# Patient Record
Sex: MALE | Race: OTHER | NOT HISPANIC OR LATINO
[De-identification: names, ages, dates, MRNs, and addresses within clinical notes are randomized per-mention and may not be internally consistent; named-entity substitution may affect disease eponyms.]

---

## 2019-01-18 ENCOUNTER — TRANSCRIPTION ENCOUNTER (OUTPATIENT)
Age: 29
End: 2019-01-18

## 2021-11-03 ENCOUNTER — INPATIENT (INPATIENT)
Facility: HOSPITAL | Age: 31
LOS: 2 days | Discharge: HOME | End: 2021-11-06
Attending: INTERNAL MEDICINE | Admitting: INTERNAL MEDICINE
Payer: COMMERCIAL

## 2021-11-03 VITALS
TEMPERATURE: 101 F | HEART RATE: 73 BPM | WEIGHT: 186.95 LBS | SYSTOLIC BLOOD PRESSURE: 115 MMHG | OXYGEN SATURATION: 100 % | DIASTOLIC BLOOD PRESSURE: 74 MMHG | RESPIRATION RATE: 18 BRPM

## 2021-11-03 DIAGNOSIS — I21.3 ST ELEVATION (STEMI) MYOCARDIAL INFARCTION OF UNSPECIFIED SITE: ICD-10-CM

## 2021-11-03 LAB
A1C WITH ESTIMATED AVERAGE GLUCOSE RESULT: 5.2 % — SIGNIFICANT CHANGE UP (ref 4–5.6)
ALBUMIN SERPL ELPH-MCNC: 4.1 G/DL — SIGNIFICANT CHANGE UP (ref 3.3–5)
ALBUMIN SERPL ELPH-MCNC: 4.6 G/DL — SIGNIFICANT CHANGE UP (ref 3.3–5)
ALP SERPL-CCNC: 76 U/L — SIGNIFICANT CHANGE UP (ref 40–120)
ALP SERPL-CCNC: 82 U/L — SIGNIFICANT CHANGE UP (ref 40–120)
ALT FLD-CCNC: 16 U/L — SIGNIFICANT CHANGE UP (ref 4–41)
ALT FLD-CCNC: 24 U/L — SIGNIFICANT CHANGE UP (ref 4–41)
AMPHET UR-MCNC: NEGATIVE — SIGNIFICANT CHANGE UP
ANION GAP SERPL CALC-SCNC: 11 MMOL/L — SIGNIFICANT CHANGE UP (ref 7–14)
ANION GAP SERPL CALC-SCNC: 11 MMOL/L — SIGNIFICANT CHANGE UP (ref 7–14)
APAP SERPL-MCNC: 7 UG/ML — LOW (ref 15–25)
APPEARANCE UR: CLEAR — SIGNIFICANT CHANGE UP
APTT BLD: 32 SEC — SIGNIFICANT CHANGE UP (ref 27–36.3)
AST SERPL-CCNC: 122 U/L — HIGH (ref 4–40)
AST SERPL-CCNC: 32 U/L — SIGNIFICANT CHANGE UP (ref 4–40)
B PERT DNA SPEC QL NAA+PROBE: SIGNIFICANT CHANGE UP
B PERT+PARAPERT DNA PNL SPEC NAA+PROBE: SIGNIFICANT CHANGE UP
BARBITURATES UR SCN-MCNC: NEGATIVE — SIGNIFICANT CHANGE UP
BASOPHILS # BLD AUTO: 0.05 K/UL — SIGNIFICANT CHANGE UP (ref 0–0.2)
BASOPHILS # BLD AUTO: 0.06 K/UL — SIGNIFICANT CHANGE UP (ref 0–0.2)
BASOPHILS NFR BLD AUTO: 0.4 % — SIGNIFICANT CHANGE UP (ref 0–2)
BASOPHILS NFR BLD AUTO: 0.4 % — SIGNIFICANT CHANGE UP (ref 0–2)
BENZODIAZ UR-MCNC: NEGATIVE — SIGNIFICANT CHANGE UP
BILIRUB SERPL-MCNC: 0.4 MG/DL — SIGNIFICANT CHANGE UP (ref 0.2–1.2)
BILIRUB SERPL-MCNC: 0.4 MG/DL — SIGNIFICANT CHANGE UP (ref 0.2–1.2)
BILIRUB UR-MCNC: NEGATIVE — SIGNIFICANT CHANGE UP
BLD GP AB SCN SERPL QL: NEGATIVE — SIGNIFICANT CHANGE UP
BORDETELLA PARAPERTUSSIS (RAPRVP): SIGNIFICANT CHANGE UP
BUN SERPL-MCNC: 11 MG/DL — SIGNIFICANT CHANGE UP (ref 7–23)
BUN SERPL-MCNC: 13 MG/DL — SIGNIFICANT CHANGE UP (ref 7–23)
C PNEUM DNA SPEC QL NAA+PROBE: SIGNIFICANT CHANGE UP
CALCIUM SERPL-MCNC: 8.8 MG/DL — SIGNIFICANT CHANGE UP (ref 8.4–10.5)
CALCIUM SERPL-MCNC: 9.3 MG/DL — SIGNIFICANT CHANGE UP (ref 8.4–10.5)
CHLORIDE SERPL-SCNC: 100 MMOL/L — SIGNIFICANT CHANGE UP (ref 98–107)
CHLORIDE SERPL-SCNC: 96 MMOL/L — LOW (ref 98–107)
CHOLEST SERPL-MCNC: 142 MG/DL — SIGNIFICANT CHANGE UP
CK MB BLD-MCNC: 10.8 % — HIGH (ref 0–2.5)
CK MB BLD-MCNC: 10.8 % — HIGH (ref 0–2.5)
CK MB BLD-MCNC: 9.4 % — HIGH (ref 0–2.5)
CK MB CFR SERPL CALC: 137.2 NG/ML — HIGH
CK MB CFR SERPL CALC: 140.6 NG/ML — HIGH
CK MB CFR SERPL CALC: 194.8 NG/ML — HIGH
CK MB CFR SERPL CALC: 99 NG/ML — HIGH
CK SERPL-CCNC: 1465 U/L — HIGH (ref 30–200)
CK SERPL-CCNC: 1808 U/L — HIGH (ref 30–200)
CK SERPL-CCNC: 919 U/L — HIGH (ref 30–200)
CO2 SERPL-SCNC: 24 MMOL/L — SIGNIFICANT CHANGE UP (ref 22–31)
CO2 SERPL-SCNC: 25 MMOL/L — SIGNIFICANT CHANGE UP (ref 22–31)
COCAINE METAB.OTHER UR-MCNC: NEGATIVE — SIGNIFICANT CHANGE UP
COLOR SPEC: SIGNIFICANT CHANGE UP
CREAT SERPL-MCNC: 1.01 MG/DL — SIGNIFICANT CHANGE UP (ref 0.5–1.3)
CREAT SERPL-MCNC: 1.05 MG/DL — SIGNIFICANT CHANGE UP (ref 0.5–1.3)
CREATININE URINE RESULT, DAU: 127 MG/DL — SIGNIFICANT CHANGE UP
CRP SERPL-MCNC: 85.7 MG/L — HIGH
DIFF PNL FLD: NEGATIVE — SIGNIFICANT CHANGE UP
EOSINOPHIL # BLD AUTO: 0.03 K/UL — SIGNIFICANT CHANGE UP (ref 0–0.5)
EOSINOPHIL # BLD AUTO: 0.08 K/UL — SIGNIFICANT CHANGE UP (ref 0–0.5)
EOSINOPHIL NFR BLD AUTO: 0.2 % — SIGNIFICANT CHANGE UP (ref 0–6)
EOSINOPHIL NFR BLD AUTO: 0.6 % — SIGNIFICANT CHANGE UP (ref 0–6)
ERYTHROCYTE [SEDIMENTATION RATE] IN BLOOD: 14 MM/HR — SIGNIFICANT CHANGE UP (ref 1–15)
ERYTHROCYTE [SEDIMENTATION RATE] IN BLOOD: 22 MM/HR — HIGH (ref 1–15)
ESTIMATED AVERAGE GLUCOSE: 103 — SIGNIFICANT CHANGE UP
ETHANOL SERPL-MCNC: <10 MG/DL — SIGNIFICANT CHANGE UP
FLUAV SUBTYP SPEC NAA+PROBE: SIGNIFICANT CHANGE UP
FLUBV RNA SPEC QL NAA+PROBE: SIGNIFICANT CHANGE UP
GLUCOSE SERPL-MCNC: 133 MG/DL — HIGH (ref 70–99)
GLUCOSE SERPL-MCNC: 169 MG/DL — HIGH (ref 70–99)
GLUCOSE UR QL: NEGATIVE — SIGNIFICANT CHANGE UP
HADV DNA SPEC QL NAA+PROBE: SIGNIFICANT CHANGE UP
HCOV 229E RNA SPEC QL NAA+PROBE: SIGNIFICANT CHANGE UP
HCOV HKU1 RNA SPEC QL NAA+PROBE: SIGNIFICANT CHANGE UP
HCOV NL63 RNA SPEC QL NAA+PROBE: SIGNIFICANT CHANGE UP
HCOV OC43 RNA SPEC QL NAA+PROBE: SIGNIFICANT CHANGE UP
HCT VFR BLD CALC: 36.9 % — LOW (ref 39–50)
HCT VFR BLD CALC: 38.1 % — LOW (ref 39–50)
HCT VFR BLD CALC: 39.6 % — SIGNIFICANT CHANGE UP (ref 39–50)
HDLC SERPL-MCNC: 56 MG/DL — SIGNIFICANT CHANGE UP
HGB BLD-MCNC: 11.6 G/DL — LOW (ref 13–17)
HGB BLD-MCNC: 12.3 G/DL — LOW (ref 13–17)
HGB BLD-MCNC: 12.6 G/DL — LOW (ref 13–17)
HMPV RNA SPEC QL NAA+PROBE: SIGNIFICANT CHANGE UP
HPIV1 RNA SPEC QL NAA+PROBE: SIGNIFICANT CHANGE UP
HPIV2 RNA SPEC QL NAA+PROBE: SIGNIFICANT CHANGE UP
HPIV3 RNA SPEC QL NAA+PROBE: SIGNIFICANT CHANGE UP
HPIV4 RNA SPEC QL NAA+PROBE: SIGNIFICANT CHANGE UP
IANC: 12.53 K/UL — HIGH (ref 1.5–8.5)
IANC: 9.5 K/UL — HIGH (ref 1.5–8.5)
IMM GRANULOCYTES NFR BLD AUTO: 0.4 % — SIGNIFICANT CHANGE UP (ref 0–1.5)
IMM GRANULOCYTES NFR BLD AUTO: 0.4 % — SIGNIFICANT CHANGE UP (ref 0–1.5)
INR BLD: 1.18 RATIO — HIGH (ref 0.88–1.16)
KETONES UR-MCNC: NEGATIVE — SIGNIFICANT CHANGE UP
LACTATE SERPL-SCNC: 2 MMOL/L — SIGNIFICANT CHANGE UP (ref 0.5–2)
LEUKOCYTE ESTERASE UR-ACNC: NEGATIVE — SIGNIFICANT CHANGE UP
LIDOCAIN IGE QN: 52 U/L — SIGNIFICANT CHANGE UP (ref 7–60)
LIPID PNL WITH DIRECT LDL SERPL: 77 MG/DL — SIGNIFICANT CHANGE UP
LYMPHOCYTES # BLD AUTO: 1.56 K/UL — SIGNIFICANT CHANGE UP (ref 1–3.3)
LYMPHOCYTES # BLD AUTO: 1.82 K/UL — SIGNIFICANT CHANGE UP (ref 1–3.3)
LYMPHOCYTES # BLD AUTO: 13.5 % — SIGNIFICANT CHANGE UP (ref 13–44)
LYMPHOCYTES # BLD AUTO: 9.6 % — LOW (ref 13–44)
M PNEUMO DNA SPEC QL NAA+PROBE: SIGNIFICANT CHANGE UP
MCHC RBC-ENTMCNC: 22.6 PG — LOW (ref 27–34)
MCHC RBC-ENTMCNC: 22.6 PG — LOW (ref 27–34)
MCHC RBC-ENTMCNC: 22.7 PG — LOW (ref 27–34)
MCHC RBC-ENTMCNC: 31.4 GM/DL — LOW (ref 32–36)
MCHC RBC-ENTMCNC: 31.8 GM/DL — LOW (ref 32–36)
MCHC RBC-ENTMCNC: 32.3 GM/DL — SIGNIFICANT CHANGE UP (ref 32–36)
MCV RBC AUTO: 70 FL — LOW (ref 80–100)
MCV RBC AUTO: 71.5 FL — LOW (ref 80–100)
MCV RBC AUTO: 71.8 FL — LOW (ref 80–100)
METHADONE UR-MCNC: NEGATIVE — SIGNIFICANT CHANGE UP
MONOCYTES # BLD AUTO: 2 K/UL — HIGH (ref 0–0.9)
MONOCYTES # BLD AUTO: 2.03 K/UL — HIGH (ref 0–0.9)
MONOCYTES NFR BLD AUTO: 12.5 % — SIGNIFICANT CHANGE UP (ref 2–14)
MONOCYTES NFR BLD AUTO: 14.8 % — HIGH (ref 2–14)
NEUTROPHILS # BLD AUTO: 12.53 K/UL — HIGH (ref 1.8–7.4)
NEUTROPHILS # BLD AUTO: 9.5 K/UL — HIGH (ref 1.8–7.4)
NEUTROPHILS NFR BLD AUTO: 70.3 % — SIGNIFICANT CHANGE UP (ref 43–77)
NEUTROPHILS NFR BLD AUTO: 76.9 % — SIGNIFICANT CHANGE UP (ref 43–77)
NITRITE UR-MCNC: NEGATIVE — SIGNIFICANT CHANGE UP
NON HDL CHOLESTEROL: 86 MG/DL — SIGNIFICANT CHANGE UP
NRBC # BLD: 0 /100 WBCS — SIGNIFICANT CHANGE UP
NRBC # FLD: 0 K/UL — SIGNIFICANT CHANGE UP
NT-PROBNP SERPL-SCNC: 135 PG/ML — SIGNIFICANT CHANGE UP
OPIATES UR-MCNC: NEGATIVE — SIGNIFICANT CHANGE UP
OXYCODONE UR-MCNC: NEGATIVE — SIGNIFICANT CHANGE UP
PCP SPEC-MCNC: SIGNIFICANT CHANGE UP
PCP UR-MCNC: NEGATIVE — SIGNIFICANT CHANGE UP
PH UR: 7 — SIGNIFICANT CHANGE UP (ref 5–8)
PLATELET # BLD AUTO: 187 K/UL — SIGNIFICANT CHANGE UP (ref 150–400)
PLATELET # BLD AUTO: 221 K/UL — SIGNIFICANT CHANGE UP (ref 150–400)
PLATELET # BLD AUTO: 224 K/UL — SIGNIFICANT CHANGE UP (ref 150–400)
POTASSIUM SERPL-MCNC: 3.8 MMOL/L — SIGNIFICANT CHANGE UP (ref 3.5–5.3)
POTASSIUM SERPL-MCNC: 4 MMOL/L — SIGNIFICANT CHANGE UP (ref 3.5–5.3)
POTASSIUM SERPL-SCNC: 3.8 MMOL/L — SIGNIFICANT CHANGE UP (ref 3.5–5.3)
POTASSIUM SERPL-SCNC: 4 MMOL/L — SIGNIFICANT CHANGE UP (ref 3.5–5.3)
PROCALCITONIN SERPL-MCNC: 0.11 NG/ML — HIGH (ref 0.02–0.1)
PROCALCITONIN SERPL-MCNC: 0.12 NG/ML — HIGH (ref 0.02–0.1)
PROT SERPL-MCNC: 7.4 G/DL — SIGNIFICANT CHANGE UP (ref 6–8.3)
PROT SERPL-MCNC: 8.1 G/DL — SIGNIFICANT CHANGE UP (ref 6–8.3)
PROT UR-MCNC: NEGATIVE — SIGNIFICANT CHANGE UP
PROTHROM AB SERPL-ACNC: 13.5 SEC — SIGNIFICANT CHANGE UP (ref 10.6–13.6)
RAPID RVP RESULT: SIGNIFICANT CHANGE UP
RBC # BLD: 5.14 M/UL — SIGNIFICANT CHANGE UP (ref 4.2–5.8)
RBC # BLD: 5.44 M/UL — SIGNIFICANT CHANGE UP (ref 4.2–5.8)
RBC # BLD: 5.54 M/UL — SIGNIFICANT CHANGE UP (ref 4.2–5.8)
RBC # FLD: 15.2 % — HIGH (ref 10.3–14.5)
RBC # FLD: 15.5 % — HIGH (ref 10.3–14.5)
RBC # FLD: 15.8 % — HIGH (ref 10.3–14.5)
RH IG SCN BLD-IMP: POSITIVE — SIGNIFICANT CHANGE UP
RSV RNA SPEC QL NAA+PROBE: SIGNIFICANT CHANGE UP
RV+EV RNA SPEC QL NAA+PROBE: SIGNIFICANT CHANGE UP
SALICYLATES SERPL-MCNC: <0.3 MG/DL — LOW (ref 15–30)
SARS-COV-2 RNA SPEC QL NAA+PROBE: SIGNIFICANT CHANGE UP
SARS-COV-2 RNA SPEC QL NAA+PROBE: SIGNIFICANT CHANGE UP
SODIUM SERPL-SCNC: 132 MMOL/L — LOW (ref 135–145)
SODIUM SERPL-SCNC: 135 MMOL/L — SIGNIFICANT CHANGE UP (ref 135–145)
SP GR SPEC: 1.02 — SIGNIFICANT CHANGE UP (ref 1–1.05)
THC UR QL: NEGATIVE — SIGNIFICANT CHANGE UP
TOXICOLOGY SCREEN, DRUGS OF ABUSE, SERUM RESULT: SIGNIFICANT CHANGE UP
TRIGL SERPL-MCNC: 44 MG/DL — SIGNIFICANT CHANGE UP
TROPONIN T, HIGH SENSITIVITY RESULT: 1152 NG/L — CRITICAL HIGH
TROPONIN T, HIGH SENSITIVITY RESULT: 1768 NG/L — CRITICAL HIGH
TROPONIN T, HIGH SENSITIVITY RESULT: 2011 NG/L — CRITICAL HIGH
TROPONIN T, HIGH SENSITIVITY RESULT: 2281 NG/L — CRITICAL HIGH
TROPONIN T, HIGH SENSITIVITY RESULT: 653 NG/L — CRITICAL HIGH
TSH SERPL-MCNC: 2.04 UIU/ML — SIGNIFICANT CHANGE UP (ref 0.27–4.2)
UROBILINOGEN FLD QL: SIGNIFICANT CHANGE UP
WBC # BLD: 13.5 K/UL — HIGH (ref 3.8–10.5)
WBC # BLD: 14.24 K/UL — HIGH (ref 3.8–10.5)
WBC # BLD: 16.27 K/UL — HIGH (ref 3.8–10.5)
WBC # FLD AUTO: 13.5 K/UL — HIGH (ref 3.8–10.5)
WBC # FLD AUTO: 14.24 K/UL — HIGH (ref 3.8–10.5)
WBC # FLD AUTO: 16.27 K/UL — HIGH (ref 3.8–10.5)

## 2021-11-03 PROCEDURE — 71045 X-RAY EXAM CHEST 1 VIEW: CPT | Mod: 26

## 2021-11-03 PROCEDURE — 93306 TTE W/DOPPLER COMPLETE: CPT | Mod: 26

## 2021-11-03 PROCEDURE — 93458 L HRT ARTERY/VENTRICLE ANGIO: CPT | Mod: 26

## 2021-11-03 PROCEDURE — 99223 1ST HOSP IP/OBS HIGH 75: CPT

## 2021-11-03 PROCEDURE — 99291 CRITICAL CARE FIRST HOUR: CPT | Mod: 25

## 2021-11-03 PROCEDURE — 93010 ELECTROCARDIOGRAM REPORT: CPT

## 2021-11-03 PROCEDURE — 93306 TTE W/DOPPLER COMPLETE: CPT | Mod: 26,59,77

## 2021-11-03 RX ORDER — IBUPROFEN 200 MG
600 TABLET ORAL THREE TIMES A DAY
Refills: 0 | Status: DISCONTINUED | OUTPATIENT
Start: 2021-11-03 | End: 2021-11-03

## 2021-11-03 RX ORDER — TICAGRELOR 90 MG/1
180 TABLET ORAL ONCE
Refills: 0 | Status: COMPLETED | OUTPATIENT
Start: 2021-11-03 | End: 2021-11-03

## 2021-11-03 RX ORDER — ACETAMINOPHEN 500 MG
1000 TABLET ORAL ONCE
Refills: 0 | Status: DISCONTINUED | OUTPATIENT
Start: 2021-11-03 | End: 2021-11-03

## 2021-11-03 RX ORDER — ACETAMINOPHEN 500 MG
650 TABLET ORAL EVERY 6 HOURS
Refills: 0 | Status: DISCONTINUED | OUTPATIENT
Start: 2021-11-03 | End: 2021-11-03

## 2021-11-03 RX ORDER — HEPARIN SODIUM 5000 [USP'U]/ML
4000 INJECTION INTRAVENOUS; SUBCUTANEOUS ONCE
Refills: 0 | Status: COMPLETED | OUTPATIENT
Start: 2021-11-03 | End: 2021-11-03

## 2021-11-03 RX ORDER — TRAMADOL HYDROCHLORIDE 50 MG/1
50 TABLET ORAL ONCE
Refills: 0 | Status: DISCONTINUED | OUTPATIENT
Start: 2021-11-03 | End: 2021-11-03

## 2021-11-03 RX ORDER — ASPIRIN/CALCIUM CARB/MAGNESIUM 324 MG
324 TABLET ORAL ONCE
Refills: 0 | Status: COMPLETED | OUTPATIENT
Start: 2021-11-03 | End: 2021-11-03

## 2021-11-03 RX ORDER — INFLUENZA VIRUS VACCINE 15; 15; 15; 15 UG/.5ML; UG/.5ML; UG/.5ML; UG/.5ML
0.5 SUSPENSION INTRAMUSCULAR ONCE
Refills: 0 | Status: DISCONTINUED | OUTPATIENT
Start: 2021-11-03 | End: 2021-11-06

## 2021-11-03 RX ORDER — ATORVASTATIN CALCIUM 80 MG/1
80 TABLET, FILM COATED ORAL ONCE
Refills: 0 | Status: COMPLETED | OUTPATIENT
Start: 2021-11-03 | End: 2021-11-03

## 2021-11-03 RX ORDER — CHLORHEXIDINE GLUCONATE 213 G/1000ML
1 SOLUTION TOPICAL
Refills: 0 | Status: DISCONTINUED | OUTPATIENT
Start: 2021-11-03 | End: 2021-11-06

## 2021-11-03 RX ORDER — ACETAMINOPHEN 500 MG
650 TABLET ORAL EVERY 6 HOURS
Refills: 0 | Status: DISCONTINUED | OUTPATIENT
Start: 2021-11-03 | End: 2021-11-06

## 2021-11-03 RX ORDER — ACETAMINOPHEN 500 MG
650 TABLET ORAL ONCE
Refills: 0 | Status: COMPLETED | OUTPATIENT
Start: 2021-11-03 | End: 2021-11-03

## 2021-11-03 RX ORDER — COLCHICINE 0.6 MG
0.6 TABLET ORAL
Refills: 0 | Status: DISCONTINUED | OUTPATIENT
Start: 2021-11-03 | End: 2021-11-06

## 2021-11-03 RX ORDER — IBUPROFEN 200 MG
800 TABLET ORAL THREE TIMES A DAY
Refills: 0 | Status: DISCONTINUED | OUTPATIENT
Start: 2021-11-03 | End: 2021-11-06

## 2021-11-03 RX ADMIN — Medication 600 MILLIGRAM(S): at 14:11

## 2021-11-03 RX ADMIN — CHLORHEXIDINE GLUCONATE 1 APPLICATION(S): 213 SOLUTION TOPICAL at 08:00

## 2021-11-03 RX ADMIN — Medication 324 MILLIGRAM(S): at 04:04

## 2021-11-03 RX ADMIN — Medication 650 MILLIGRAM(S): at 14:10

## 2021-11-03 RX ADMIN — Medication 650 MILLIGRAM(S): at 06:56

## 2021-11-03 RX ADMIN — TICAGRELOR 180 MILLIGRAM(S): 90 TABLET ORAL at 04:15

## 2021-11-03 RX ADMIN — Medication 400 MILLIGRAM(S): at 15:00

## 2021-11-03 RX ADMIN — Medication 800 MILLIGRAM(S): at 22:28

## 2021-11-03 RX ADMIN — Medication 1000 MILLIGRAM(S): at 15:40

## 2021-11-03 RX ADMIN — TRAMADOL HYDROCHLORIDE 50 MILLIGRAM(S): 50 TABLET ORAL at 19:25

## 2021-11-03 RX ADMIN — Medication 600 MILLIGRAM(S): at 15:20

## 2021-11-03 RX ADMIN — Medication 650 MILLIGRAM(S): at 15:20

## 2021-11-03 RX ADMIN — Medication 650 MILLIGRAM(S): at 06:26

## 2021-11-03 RX ADMIN — TRAMADOL HYDROCHLORIDE 50 MILLIGRAM(S): 50 TABLET ORAL at 19:30

## 2021-11-03 RX ADMIN — Medication 0.6 MILLIGRAM(S): at 11:16

## 2021-11-03 RX ADMIN — HEPARIN SODIUM 4000 UNIT(S): 5000 INJECTION INTRAVENOUS; SUBCUTANEOUS at 04:19

## 2021-11-03 RX ADMIN — Medication 0.6 MILLIGRAM(S): at 17:35

## 2021-11-03 RX ADMIN — Medication 800 MILLIGRAM(S): at 22:52

## 2021-11-03 RX ADMIN — ATORVASTATIN CALCIUM 80 MILLIGRAM(S): 80 TABLET, FILM COATED ORAL at 04:04

## 2021-11-03 NOTE — H&P ADULT - NSHPLABSRESULTS_GEN_ALL_CORE
LABS:                          12.6   16.27 )-----------( 221      ( 03 Nov 2021 04:25 )             39.6     11-03    132<L>  |  96<L>  |  13  ----------------------------<  169<H>  3.8   |  25  |  1.05    Ca    9.3      03 Nov 2021 04:25    TPro  8.1  /  Alb  4.6  /  TBili  0.4  /  DBili  x   /  AST  32  /  ALT  16  /  AlkPhos  82  11-03    LIVER FUNCTIONS - ( 03 Nov 2021 04:25 )  Alb: 4.6 g/dL / Pro: 8.1 g/dL / ALK PHOS: 82 U/L / ALT: 16 U/L / AST: 32 U/L / GGT: x           PT/INR - ( 03 Nov 2021 04:25 )   PT: 13.5 sec;   INR: 1.18 ratio         PTT - ( 03 Nov 2021 04:25 )  PTT:32.0 sec    HsT: 653

## 2021-11-03 NOTE — ED ADULT NURSE NOTE - OBJECTIVE STATEMENT
30 yo M AAOx4 received to [facility] with midsternal CP with sudden onset around 22:00, nonradiating, reports having generalized fatigue, malaise, HA, and body aches for the past few days, denies fevers, appears pale with slight diaphoresis to skin, #18 placed b/l to AC's,  placed on zoll pads, no tachycardia on CM, pending cardiology consult to r/o MI

## 2021-11-03 NOTE — ED PROVIDER NOTE - CLINICAL SUMMARY MEDICAL DECISION MAKING FREE TEXT BOX
32 yo M with no known Past Medical History that presents with chest pain found on triage EKG to have STEMI. STEMI activation activated immediately upon reception of EKG and pt placed in main ED trauma A. Labs and imaging ordered, meds given including ASA, Brillinta, Lipitor, and Heparin given, Cardiac cath lab activated. Pt placed on Zoll monitoring and cardiac monitoring.

## 2021-11-03 NOTE — ED PROVIDER NOTE - CRITICAL CARE ATTENDING CONTRIBUTION TO CARE
Upon my evaluation, this patient had a high probability of imminent or life-threatening deterioration due to STEMI on EKG and chest pain which required my direct attention, intervention, and personal management.  The patient has a  medical condition that impairs one or more vital organ systems.  Frequent personal assessment and adjustment of medical interventions was performed.      I have personally provided 31 minutes of critical care time exclusive of time spent on separately billable procedures. Time includes review of laboratory data, radiology results, discussion with consultants, patient and family; monitoring for potential decompensation, as well as time spent retrieving data and reviewing the chart and documenting the visit. Interventions were performed as documented above.

## 2021-11-03 NOTE — PROGRESS NOTE ADULT - ASSESSMENT
32 yo male with no PMH presenting to the ED with chest pain since 10pm on 11/2 admitted to the hospital for STEMI. S/p RHC with no blockages seen. Transferred to CCU for possible myocarditis.     Plan:    Neuro:  - Awake, alert, oriented, x 4  - No active issues    HEENT:  - No active issues    Respiratory:  - Unlabored respirations on RA, 99% on RA  - No active issues    CV:  #Myocarditis   - Patient presented to ER with c/o chest pain.   - EKG revealed ST elevation in I, II, III, aVF, v5, v6, with STD in aVR, v1 v2  - Initial set cardiac enzyme 654  - S/p RHC with  and Brilinta 180 mg and heparin load  - Cath showed clean coronaries  - Pending TTE   - Serial EKG to assess for resolution of ST changes  - Monitor vital signs to monitor hemodynamic stability  - Continuous cardiac monitoring to monitor for arrhythmias  - Trend cardiac enzymes  - Check radial for hematoma or bleeding    GI:  - DASH diet  - No active issues    Renal:  - SCr 1.05  - No active issues    Endocrine:  - HbA1c 5.2, lipid panel wnl, TSH 2.04 wnl   - No other active issues    ID:  - Febrile 100.9F in ED  - F/u cultures  - RVP, COVID PCR, Bordatella negative   - Vaccinated with Pfizer x2     DVT prophylaxis:  Heparin subq    Ethics: FULL CODE 30 yo male with no PMH presenting to the ED with chest pain and a few days of fever, chills, myalgias admitted to the hospital for GHADA in I, II, III, aVF, V4-V6 and STD in avR, V1, V2. S/p RHC with no blockages seen. Transferred to CCU for possible myocarditis.     Plan:    Neuro:  - Awake, alert, oriented, x 4  - No active issues    HEENT:  - No active issues    Respiratory:  - Unlabored respirations on RA, 99% on RA  - No active issues    CV:  #Myocarditis   - Patient presented to ER with c/o chest pain and troponin 653   - EKG revealed ST elevation in I, II, III, aVF, v5, v6, with STD in aVR, v1 v2  - S/p RHC with  and Brilinta 180 mg and heparin load  - Cath showed clean coronaries  - Pending TTE   - Serial EKG to assess for resolution of ST changes  - Continuous cardiac monitoring to monitor for arrhythmias  - Trend cardiac enzymes q6h until peak   - Tylenol PRN for pain     GI:  - DASH diet  - No active issues    Renal:  - SCr 1.05  - No active issues    Endocrine:  - HbA1c 5.2, lipid panel wnl, TSH 2.04 wnl   - No other active issues    ID:  - Febrile 100.9F in ED  - CXR: clear lungs   - F/u cultures  - RVP, COVID PCR, Bordetella negative   - Vaccinated with Pfizer x2 in 05/2021     DVT prophylaxis:  Heparin subq    Ethics: FULL CODE 32 yo male with no PMH presenting to the ED with chest pain and a few days of fever, chills, myalgias admitted to the hospital for GHADA in I, II, III, aVF, V4-V6 and STD in avR, V1, V2. S/p RHC with no blockages seen. Transferred to CCU for possible myocarditis.     Plan:    Neuro:  - Awake, alert, oriented, x 4  - No active issues    HEENT:  - No active issues    Respiratory:  - Unlabored respirations on RA, 99% on RA  - No active issues    CV:  #Myocarditis   - Patient presented to ER with c/o chest pain and troponin 653   - EKG revealed ST elevation in I, II, III, aVF, v5, v6, with STD in aVR, v1 v2  - S/p RHC with  and Brilinta 180 mg and heparin load  - Cath showed clean coronaries  - Pending TTE   - Serial EKG to assess for resolution of ST changes  - Continuous cardiac monitoring to monitor for arrhythmias  - Trend cardiac enzymes q6h until peak   - Tylenol PRN for pain     GI:  - DASH diet  - No active issues    Renal:  - SCr 1.05  - No active issues    Endocrine:  - HbA1c 5.2, lipid panel wnl, TSH 2.04 wnl   - No other active issues    ID:  - Febrile 100.9F in ED  - WBC 16 on admission, now downtrending   - CXR: clear lungs   - F/u cultures  - RVP, COVID PCR, Bordetella negative   - Vaccinated with Pfizer x2 in 05/2021     DVT prophylaxis:  Heparin subq    Ethics: FULL CODE 30 yo male with no PMH presenting to the ED with chest pain and a few days of fever, chills, myalgias admitted to the hospital for GHADA in I, II, III, aVF, V4-V6 and STD in avR, V1, V2. S/p RHC with no blockages seen. Transferred to CCU for possible myocarditis.     Plan:    Neuro:  - Awake, alert, oriented, x 4  - No active issues    HEENT:  - No active issues    Respiratory:  - Unlabored respirations on RA, 99% on RA  - No active issues    CV:  #Myocarditis   - Patient presented to ER with c/o chest pain and troponin 653, CKMB 99, CRP 85.7   - EKG revealed ST elevation in I, II, III, aVF, v5, v6, with STD in aVR, v1 v2  - S/p RHC with  and Brilinta 180 mg and heparin load  - Cath showed clean coronaries  - Pending TTE   - Serial EKG to assess for resolution of ST changes  - Continuous cardiac monitoring to monitor for arrhythmias  - Trend cardiac enzymes q6h until peak   - Ibuprofen 600mg three times a day with meals and colchicine 0.6mg q12h  - Tylenol PRN for pain     GI:  - DASH diet  - No active issues    Renal:  - SCr 1.05  - No active issues    Endocrine:  - HbA1c 5.2, lipid panel wnl, TSH 2.04 wnl   - No other active issues    ID:  - Pt with 3 days of fevers, chills, myalgias and exposure to large gathering on Halloween weekend   - Febrile 100.9F in ED  - WBC 16 on admission, now downtrending   - CXR: clear lungs   - F/u cultures  - RVP, COVID PCR, Bordetella negative   - F/u repeat RVP and COVID PCR   - Vaccinated with Pfizer x2 in 05/2021     DVT prophylaxis:  Heparin subq    Ethics: FULL CODE

## 2021-11-03 NOTE — ED PROCEDURE NOTE - ATTENDING CONTRIBUTION TO CARE
DR. NAJERA, ATTENDING MD-  I was in the exam room and observed and supervised the resident when they were completing the key portions of this procedure.

## 2021-11-03 NOTE — H&P ADULT - NSHPREVIEWOFSYSTEMS_GEN_ALL_CORE
CONSTITUTIONAL: + fevers, + chills No weakness  NECK: No pain or stiffness  RESPIRATORY: No cough, wheezing, hemoptysis; No shortness of breath  CARDIOVASCULAR: + chest pain, no palpitations   GASTROINTESTINAL: No abdominal or epigastric pain. No nausea, vomiting, or hematemesis; No diarrhea or constipation. No melena or hematochezia.  GENITOURINARY: No dysuria, frequency or hematuria  NEUROLOGICAL: No numbness or weakness  SKIN: No itching, rashes

## 2021-11-03 NOTE — CHART NOTE - NSCHARTNOTEFT_GEN_A_CORE
Right radial band removed at 9am. No bleeding, no hematoma. VSS. Hemostasis achieved, will continue to closely monitor.

## 2021-11-03 NOTE — H&P ADULT - NSHPSOCIALHISTORY_GEN_ALL_CORE
Patient lives at home,  in [place], Uses [employer] occasionally, Denies drug use, Occasional alcohol use

## 2021-11-03 NOTE — ED ADULT TRIAGE NOTE - CHIEF COMPLAINT QUOTE
C/o constant midsternal chest pain that began at 10:15pm. Also endorses bodyaches, sore throat, cough, fever, generalized headache for 3 days. States fully vaccinated with Pfizer. Denies PMHx.

## 2021-11-03 NOTE — PROGRESS NOTE ADULT - SUBJECTIVE AND OBJECTIVE BOX
PATIENT: JEANNETTE HARRIS, MRN: [id number]    CHIEF COMPLAINT: Patient is a 31y old  Male who presents with a chief complaint of STEMI (03 Nov 2021 04:38)      INTERVAL HISTORY/OVERNIGHT EVENTS: No overnight events. At bedside, patient has been sleeping and eating well. Denies N/V/D/C. Denies abdominal pain. Denies chest pain or SOB, cough. Has been ambulating with assistance. Oriented to person, place, and time. Breathing comfortably on room air.      MEDICATIONS:  MEDICATIONS  (STANDING):  chlorhexidine 4% Liquid 1 Application(s) Topical <User Schedule>  influenza   Vaccine 0.5 milliLiter(s) IntraMuscular once    MEDICATIONS  (PRN):      ALLERGIES: Allergies    No Known Allergies    Intolerances        OBJECTIVE:  ICU Vital Signs Last 24 Hrs  T(C): 37.2 (03 Nov 2021 07:46), Max: 38.3 (03 Nov 2021 03:32)  T(F): 98.9 (03 Nov 2021 07:46), Max: 100.9 (03 Nov 2021 03:32)  HR: 75 (03 Nov 2021 06:45) (70 - 86)  BP: 109/59 (03 Nov 2021 06:45) (99/62 - 135/77)  BP(mean): 72 (03 Nov 2021 06:45) (72 - 82)  ABP: --  ABP(mean): --  RR: 24 (03 Nov 2021 06:45) (15 - 24)  SpO2: 100% (03 Nov 2021 06:45) (99% - 100%)      Adult Advanced Hemodynamics Last 24 Hrs  CVP(mm Hg): --  CVP(cm H2O): --  CO: --  CI: --  PA: --  PA(mean): --  PCWP: --  SVR: --  SVRI: --  PVR: --  PVRI: --  CAPILLARY BLOOD GLUCOSE      POCT Blood Glucose.: 158 mg/dL (03 Nov 2021 04:04)    CAPILLARY BLOOD GLUCOSE      POCT Blood Glucose.: 158 mg/dL (03 Nov 2021 04:04)    I&O's Summary    03 Nov 2021 07:01  -  03 Nov 2021 08:14  --------------------------------------------------------  IN: 0 mL / OUT: 400 mL / NET: -400 mL      Daily Height in cm: 167.64 (03 Nov 2021 05:30)    Daily     PHYSICAL EXAMINATION:  General: Comfortable, no acute distress, cooperative with exam.  HEENT: PERRLA, EOMI, moist mucous membranes. No JVD.   Respiratory: CTAB, normal respiratory effort, no coughing, wheezes, crackles, or rales.  CV: RRR, S1S2, no murmurs, rubs or gallops. No JVD. Distal pulses intact.  Abdominal: +BS. Soft, nontender, nondistended, no rebound or guarding.  Neurology: AOx3, no focal neuro defects, AN x 4.  Extremities: No pitting edema, + Peripheral pulses.  Incisions: Right radial band   Tubes:    LABS:                          11.6   14.24 )-----------( 187      ( 03 Nov 2021 06:19 )             36.9     11-03    132<L>  |  96<L>  |  13  ----------------------------<  169<H>  3.8   |  25  |  1.05    Ca    9.3      03 Nov 2021 04:25    TPro  8.1  /  Alb  4.6  /  TBili  0.4  /  DBili  x   /  AST  32  /  ALT  16  /  AlkPhos  82  11-03    LIVER FUNCTIONS - ( 03 Nov 2021 04:25 )  Alb: 4.6 g/dL / Pro: 8.1 g/dL / ALK PHOS: 82 U/L / ALT: 16 U/L / AST: 32 U/L / GGT: x           PT/INR - ( 03 Nov 2021 04:25 )   PT: 13.5 sec;   INR: 1.18 ratio         PTT - ( 03 Nov 2021 04:25 )  PTT:32.0 sec            TELEMETRY:     EKG:     IMAGING:  PATIENT: JEANNETTE HARRIS, MRN: [id number]    CHIEF COMPLAINT: Patient is a 31y old  Male who presents with a chief complaint of STEMI (03 Nov 2021 04:38)      INTERVAL HISTORY/OVERNIGHT EVENTS: Radial band removed at 8am. At bedside, patient's chest pain has improved from an 8/10 on presentation now to a 3/10. Pain increases with movement and deep breaths. Patient denies abdominal pain, SOB, N/V/D/C. Oriented to person, place, and time. Breathing comfortably on room air.       MEDICATIONS:  MEDICATIONS  (STANDING):  chlorhexidine 4% Liquid 1 Application(s) Topical <User Schedule>  influenza   Vaccine 0.5 milliLiter(s) IntraMuscular once    MEDICATIONS  (PRN):      ALLERGIES: Allergies    No Known Allergies    Intolerances        OBJECTIVE:  ICU Vital Signs Last 24 Hrs  T(C): 37.2 (03 Nov 2021 07:46), Max: 38.3 (03 Nov 2021 03:32)  T(F): 98.9 (03 Nov 2021 07:46), Max: 100.9 (03 Nov 2021 03:32)  HR: 75 (03 Nov 2021 06:45) (70 - 86)  BP: 109/59 (03 Nov 2021 06:45) (99/62 - 135/77)  BP(mean): 72 (03 Nov 2021 06:45) (72 - 82)  ABP: --  ABP(mean): --  RR: 24 (03 Nov 2021 06:45) (15 - 24)  SpO2: 100% (03 Nov 2021 06:45) (99% - 100%)      Adult Advanced Hemodynamics Last 24 Hrs  CVP(mm Hg): --  CVP(cm H2O): --  CO: --  CI: --  PA: --  PA(mean): --  PCWP: --  SVR: --  SVRI: --  PVR: --  PVRI: --  CAPILLARY BLOOD GLUCOSE      POCT Blood Glucose.: 158 mg/dL (03 Nov 2021 04:04)    CAPILLARY BLOOD GLUCOSE      POCT Blood Glucose.: 158 mg/dL (03 Nov 2021 04:04)    I&O's Summary    03 Nov 2021 07:01  -  03 Nov 2021 08:14  --------------------------------------------------------  IN: 0 mL / OUT: 400 mL / NET: -400 mL      Daily Height in cm: 167.64 (03 Nov 2021 05:30)    Daily     PHYSICAL EXAMINATION:  General: Comfortable, no acute distress, cooperative with exam.  HEENT: PERRLA, EOMI, moist mucous membranes. No JVD.   Respiratory: CTAB, normal respiratory effort, no coughing, wheezes, crackles, or rales.  CV: RRR, S1S2, no murmurs, rubs or gallops. No JVD. Distal pulses intact.  Abdominal: +BS. Soft, nontender, nondistended, no rebound or guarding.  Neurology: AOx3, no focal neuro defects, AN x 4.  Extremities: No pitting edema, + Peripheral pulses.  Incisions: Right radial band   Tubes:    LABS:                          11.6   14.24 )-----------( 187      ( 03 Nov 2021 06:19 )             36.9     11-03    132<L>  |  96<L>  |  13  ----------------------------<  169<H>  3.8   |  25  |  1.05    Ca    9.3      03 Nov 2021 04:25    TPro  8.1  /  Alb  4.6  /  TBili  0.4  /  DBili  x   /  AST  32  /  ALT  16  /  AlkPhos  82  11-03    LIVER FUNCTIONS - ( 03 Nov 2021 04:25 )  Alb: 4.6 g/dL / Pro: 8.1 g/dL / ALK PHOS: 82 U/L / ALT: 16 U/L / AST: 32 U/L / GGT: x           PT/INR - ( 03 Nov 2021 04:25 )   PT: 13.5 sec;   INR: 1.18 ratio         PTT - ( 03 Nov 2021 04:25 )  PTT:32.0 sec            TELEMETRY:     EKG:     IMAGING:  PATIENT: JEANNETTE HARRIS, MRN: [id number]    CHIEF COMPLAINT: Patient is a 31y old  Male who presents with a chief complaint of STEMI (03 Nov 2021 04:38)      INTERVAL HISTORY/OVERNIGHT EVENTS: Radial band removed at 9am. At bedside, patient's chest pain has improved from an 8/10 on presentation now to a 3/10. Pain increases with movement and deep breaths. Patient denies abdominal pain, SOB, N/V/D/C. Oriented to person, place, and time. Breathing comfortably on room air.       MEDICATIONS:  MEDICATIONS  (STANDING):  chlorhexidine 4% Liquid 1 Application(s) Topical <User Schedule>  influenza   Vaccine 0.5 milliLiter(s) IntraMuscular once    MEDICATIONS  (PRN):      ALLERGIES: Allergies    No Known Allergies    Intolerances        OBJECTIVE:  ICU Vital Signs Last 24 Hrs  T(C): 37.2 (03 Nov 2021 07:46), Max: 38.3 (03 Nov 2021 03:32)  T(F): 98.9 (03 Nov 2021 07:46), Max: 100.9 (03 Nov 2021 03:32)  HR: 75 (03 Nov 2021 06:45) (70 - 86)  BP: 109/59 (03 Nov 2021 06:45) (99/62 - 135/77)  BP(mean): 72 (03 Nov 2021 06:45) (72 - 82)  ABP: --  ABP(mean): --  RR: 24 (03 Nov 2021 06:45) (15 - 24)  SpO2: 100% (03 Nov 2021 06:45) (99% - 100%)      Adult Advanced Hemodynamics Last 24 Hrs  CVP(mm Hg): --  CVP(cm H2O): --  CO: --  CI: --  PA: --  PA(mean): --  PCWP: --  SVR: --  SVRI: --  PVR: --  PVRI: --  CAPILLARY BLOOD GLUCOSE      POCT Blood Glucose.: 158 mg/dL (03 Nov 2021 04:04)    CAPILLARY BLOOD GLUCOSE      POCT Blood Glucose.: 158 mg/dL (03 Nov 2021 04:04)    I&O's Summary    03 Nov 2021 07:01  -  03 Nov 2021 08:14  --------------------------------------------------------  IN: 0 mL / OUT: 400 mL / NET: -400 mL      Daily Height in cm: 167.64 (03 Nov 2021 05:30)    Daily     PHYSICAL EXAMINATION:  General: Comfortable, no acute distress, cooperative with exam.  HEENT: PERRLA, EOMI, moist mucous membranes. No JVD.   Respiratory: CTAB, normal respiratory effort, no coughing, wheezes, crackles, or rales.  CV: RRR, S1S2, no murmurs, rubs or gallops. No JVD. Distal pulses intact.  Abdominal: +BS. Soft, nontender, nondistended, no rebound or guarding.  Neurology: AOx3, no focal neuro defects, AN x 4.  Extremities: No pitting edema, + Peripheral pulses.  Incisions: Right radial band   Tubes:    LABS:                          11.6   14.24 )-----------( 187      ( 03 Nov 2021 06:19 )             36.9     11-03    132<L>  |  96<L>  |  13  ----------------------------<  169<H>  3.8   |  25  |  1.05    Ca    9.3      03 Nov 2021 04:25    TPro  8.1  /  Alb  4.6  /  TBili  0.4  /  DBili  x   /  AST  32  /  ALT  16  /  AlkPhos  82  11-03    LIVER FUNCTIONS - ( 03 Nov 2021 04:25 )  Alb: 4.6 g/dL / Pro: 8.1 g/dL / ALK PHOS: 82 U/L / ALT: 16 U/L / AST: 32 U/L / GGT: x           PT/INR - ( 03 Nov 2021 04:25 )   PT: 13.5 sec;   INR: 1.18 ratio         PTT - ( 03 Nov 2021 04:25 )  PTT:32.0 sec            TELEMETRY:     EKG:     IMAGING:

## 2021-11-03 NOTE — H&P ADULT - HISTORY OF PRESENT ILLNESS
This is a 32 y/o male with no pmh who presents to the ED with c/o of 7/10 chest pain since 10pm. He states he was out to dinner when the chest pain suddenly came on. He went home and went to sleep and woke with chest pain again prompting him to come to the ED. HE also c/o of chills, fever, and myalgias for the past few days. He denies any nausea, vomiting, sob, headache, abdominal pain, dysuria, palpitations, and dizziness.    EKG showed GHADA in I, II, III, aVF, v4-v6 with ST depressions in aVR v1 and v2. Cath lab activated by Dr. Gutierrez and patient transported

## 2021-11-03 NOTE — H&P ADULT - NSHPPHYSICALEXAM_GEN_ALL_CORE
VITALS:   T(C): 38.3 (11-03-21 @ 03:32), Max: 38.3 (11-03-21 @ 03:32)  HR: 77 (11-03-21 @ 04:05) (73 - 77)  BP: 135/77 (11-03-21 @ 04:05) (115/74 - 135/77)  RR: 16 (11-03-21 @ 04:05) (16 - 18)  SpO2: 100% (11-03-21 @ 04:05) (100% - 100%)    GENERAL: 32 y/o sitting up in bed in NAD  NECK: Supple, No JVD  CHEST/LUNG: Clear to auscultation bilaterally; No rales, rhonchi, wheezing, or rubs. Unlabored respirations  HEART: Regular rate and rhythm; No murmurs, rubs, or gallops  ABDOMEN: BSx4; Soft, nontender, nondistended  EXTREMITIES:  2+ Peripheral Pulses, brisk capillary refill. No clubbing, cyanosis, or edema  NERVOUS SYSTEM:  A&Ox3, no focal deficits, spencer x 4  SKIN: No rashes or lesions

## 2021-11-03 NOTE — ED PROVIDER NOTE - OBJECTIVE STATEMENT
30 yo M with no reported Past Medical History that presents with chest pain. Chest pain substernal, constant, non radiating, associated with diaphoresis, started at rest tonight approx 6 hours prior to arrival. Pt reports he was at rest when pain started and tried to sleep but unable so came to ED. Pt states lately has been unwell with viral like illness, tonight took airborne, went on date and had one alcoholic drink but denies any other ingestion such as drugs or smoking. Never happened before. Pt states approx 1 month ago had some dental fillings replaced.

## 2021-11-04 LAB
ALBUMIN SERPL ELPH-MCNC: 3.8 G/DL — SIGNIFICANT CHANGE UP (ref 3.3–5)
ALBUMIN SERPL ELPH-MCNC: 4 G/DL — SIGNIFICANT CHANGE UP (ref 3.3–5)
ALP SERPL-CCNC: 75 U/L — SIGNIFICANT CHANGE UP (ref 40–120)
ALP SERPL-CCNC: 77 U/L — SIGNIFICANT CHANGE UP (ref 40–120)
ALT FLD-CCNC: 35 U/L — SIGNIFICANT CHANGE UP (ref 4–41)
ALT FLD-CCNC: 35 U/L — SIGNIFICANT CHANGE UP (ref 4–41)
ANION GAP SERPL CALC-SCNC: 13 MMOL/L — SIGNIFICANT CHANGE UP (ref 7–14)
ANION GAP SERPL CALC-SCNC: 15 MMOL/L — HIGH (ref 7–14)
AST SERPL-CCNC: 140 U/L — HIGH (ref 4–40)
AST SERPL-CCNC: 154 U/L — HIGH (ref 4–40)
BILIRUB SERPL-MCNC: 0.2 MG/DL — SIGNIFICANT CHANGE UP (ref 0.2–1.2)
BILIRUB SERPL-MCNC: 0.4 MG/DL — SIGNIFICANT CHANGE UP (ref 0.2–1.2)
BUN SERPL-MCNC: 17 MG/DL — SIGNIFICANT CHANGE UP (ref 7–23)
BUN SERPL-MCNC: 18 MG/DL — SIGNIFICANT CHANGE UP (ref 7–23)
CALCIUM SERPL-MCNC: 9.4 MG/DL — SIGNIFICANT CHANGE UP (ref 8.4–10.5)
CALCIUM SERPL-MCNC: 9.7 MG/DL — SIGNIFICANT CHANGE UP (ref 8.4–10.5)
CHLORIDE SERPL-SCNC: 100 MMOL/L — SIGNIFICANT CHANGE UP (ref 98–107)
CHLORIDE SERPL-SCNC: 98 MMOL/L — SIGNIFICANT CHANGE UP (ref 98–107)
CK MB CFR SERPL CALC: 160.9 NG/ML — HIGH
CK SERPL-CCNC: 1552 U/L — HIGH (ref 30–200)
CO2 SERPL-SCNC: 22 MMOL/L — SIGNIFICANT CHANGE UP (ref 22–31)
CO2 SERPL-SCNC: 24 MMOL/L — SIGNIFICANT CHANGE UP (ref 22–31)
COVID-19 NUCLEOCAPSID GAM AB INTERP: NEGATIVE — SIGNIFICANT CHANGE UP
COVID-19 NUCLEOCAPSID TOTAL GAM ANTIBODY RESULT: 0.07 INDEX — SIGNIFICANT CHANGE UP
CREAT SERPL-MCNC: 1.01 MG/DL — SIGNIFICANT CHANGE UP (ref 0.5–1.3)
CREAT SERPL-MCNC: 1.11 MG/DL — SIGNIFICANT CHANGE UP (ref 0.5–1.3)
GLUCOSE SERPL-MCNC: 109 MG/DL — HIGH (ref 70–99)
GLUCOSE SERPL-MCNC: 126 MG/DL — HIGH (ref 70–99)
HCT VFR BLD CALC: 40.8 % — SIGNIFICANT CHANGE UP (ref 39–50)
HCT VFR BLD CALC: 42.4 % — SIGNIFICANT CHANGE UP (ref 39–50)
HGB BLD-MCNC: 12.8 G/DL — LOW (ref 13–17)
HGB BLD-MCNC: 12.8 G/DL — LOW (ref 13–17)
MAGNESIUM SERPL-MCNC: 2.4 MG/DL — SIGNIFICANT CHANGE UP (ref 1.6–2.6)
MCHC RBC-ENTMCNC: 22.3 PG — LOW (ref 27–34)
MCHC RBC-ENTMCNC: 22.7 PG — LOW (ref 27–34)
MCHC RBC-ENTMCNC: 30.2 GM/DL — LOW (ref 32–36)
MCHC RBC-ENTMCNC: 31.4 GM/DL — LOW (ref 32–36)
MCV RBC AUTO: 72.2 FL — LOW (ref 80–100)
MCV RBC AUTO: 73.9 FL — LOW (ref 80–100)
NRBC # BLD: 0 /100 WBCS — SIGNIFICANT CHANGE UP
NRBC # BLD: 0 /100 WBCS — SIGNIFICANT CHANGE UP
NRBC # FLD: 0 K/UL — SIGNIFICANT CHANGE UP
NRBC # FLD: 0 K/UL — SIGNIFICANT CHANGE UP
PHOSPHATE SERPL-MCNC: 4.7 MG/DL — HIGH (ref 2.5–4.5)
PLATELET # BLD AUTO: 220 K/UL — SIGNIFICANT CHANGE UP (ref 150–400)
PLATELET # BLD AUTO: 230 K/UL — SIGNIFICANT CHANGE UP (ref 150–400)
POTASSIUM SERPL-MCNC: 4 MMOL/L — SIGNIFICANT CHANGE UP (ref 3.5–5.3)
POTASSIUM SERPL-MCNC: 4.5 MMOL/L — SIGNIFICANT CHANGE UP (ref 3.5–5.3)
POTASSIUM SERPL-SCNC: 4 MMOL/L — SIGNIFICANT CHANGE UP (ref 3.5–5.3)
POTASSIUM SERPL-SCNC: 4.5 MMOL/L — SIGNIFICANT CHANGE UP (ref 3.5–5.3)
PROT SERPL-MCNC: 7.2 G/DL — SIGNIFICANT CHANGE UP (ref 6–8.3)
PROT SERPL-MCNC: 7.5 G/DL — SIGNIFICANT CHANGE UP (ref 6–8.3)
RBC # BLD: 5.65 M/UL — SIGNIFICANT CHANGE UP (ref 4.2–5.8)
RBC # BLD: 5.74 M/UL — SIGNIFICANT CHANGE UP (ref 4.2–5.8)
RBC # FLD: 15.9 % — HIGH (ref 10.3–14.5)
RBC # FLD: 15.9 % — HIGH (ref 10.3–14.5)
SARS-COV-2 IGG+IGM SERPL QL IA: 0.07 INDEX — SIGNIFICANT CHANGE UP
SARS-COV-2 IGG+IGM SERPL QL IA: NEGATIVE — SIGNIFICANT CHANGE UP
SODIUM SERPL-SCNC: 135 MMOL/L — SIGNIFICANT CHANGE UP (ref 135–145)
SODIUM SERPL-SCNC: 137 MMOL/L — SIGNIFICANT CHANGE UP (ref 135–145)
TROPONIN T, HIGH SENSITIVITY RESULT: 2503 NG/L — CRITICAL HIGH
TROPONIN T, HIGH SENSITIVITY RESULT: 2743 NG/L — CRITICAL HIGH
TROPONIN T, HIGH SENSITIVITY RESULT: 2980 NG/L — CRITICAL HIGH
TROPONIN T, HIGH SENSITIVITY RESULT: 3683 NG/L — CRITICAL HIGH
WBC # BLD: 12.21 K/UL — HIGH (ref 3.8–10.5)
WBC # BLD: 14.85 K/UL — HIGH (ref 3.8–10.5)
WBC # FLD AUTO: 12.21 K/UL — HIGH (ref 3.8–10.5)
WBC # FLD AUTO: 14.85 K/UL — HIGH (ref 3.8–10.5)

## 2021-11-04 PROCEDURE — 75561 CARDIAC MRI FOR MORPH W/DYE: CPT | Mod: 26

## 2021-11-04 PROCEDURE — 99233 SBSQ HOSP IP/OBS HIGH 50: CPT

## 2021-11-04 RX ORDER — ENOXAPARIN SODIUM 100 MG/ML
40 INJECTION SUBCUTANEOUS DAILY
Refills: 0 | Status: DISCONTINUED | OUTPATIENT
Start: 2021-11-04 | End: 2021-11-06

## 2021-11-04 RX ORDER — METOPROLOL TARTRATE 50 MG
12.5 TABLET ORAL EVERY 12 HOURS
Refills: 0 | Status: DISCONTINUED | OUTPATIENT
Start: 2021-11-04 | End: 2021-11-06

## 2021-11-04 RX ORDER — PANTOPRAZOLE SODIUM 20 MG/1
40 TABLET, DELAYED RELEASE ORAL
Refills: 0 | Status: DISCONTINUED | OUTPATIENT
Start: 2021-11-04 | End: 2021-11-06

## 2021-11-04 RX ADMIN — Medication 800 MILLIGRAM(S): at 22:42

## 2021-11-04 RX ADMIN — Medication 800 MILLIGRAM(S): at 21:42

## 2021-11-04 RX ADMIN — Medication 650 MILLIGRAM(S): at 07:55

## 2021-11-04 RX ADMIN — ENOXAPARIN SODIUM 40 MILLIGRAM(S): 100 INJECTION SUBCUTANEOUS at 13:36

## 2021-11-04 RX ADMIN — Medication 800 MILLIGRAM(S): at 13:36

## 2021-11-04 RX ADMIN — CHLORHEXIDINE GLUCONATE 1 APPLICATION(S): 213 SOLUTION TOPICAL at 08:01

## 2021-11-04 RX ADMIN — Medication 650 MILLIGRAM(S): at 05:36

## 2021-11-04 RX ADMIN — Medication 0.6 MILLIGRAM(S): at 05:36

## 2021-11-04 RX ADMIN — Medication 12.5 MILLIGRAM(S): at 18:08

## 2021-11-04 RX ADMIN — Medication 800 MILLIGRAM(S): at 07:55

## 2021-11-04 RX ADMIN — Medication 2 MILLIGRAM(S): at 11:01

## 2021-11-04 RX ADMIN — Medication 800 MILLIGRAM(S): at 05:36

## 2021-11-04 RX ADMIN — Medication 0.6 MILLIGRAM(S): at 18:07

## 2021-11-04 RX ADMIN — Medication 800 MILLIGRAM(S): at 14:10

## 2021-11-04 NOTE — PROGRESS NOTE ADULT - ASSESSMENT
30 yo male with no PMH presenting to the ED with chest pain and a few days of fever, chills, myalgias admitted to the hospital for GHADA in I, II, III, aVF, V4-V6 and STD in avR, V1, V2. S/p RHC with no blockages seen. Transferred to CCU for myocarditis. Echo with EF 48% and mild segmental LV systolic dysfunction.     Plan:    Neuro:  - Awake, alert, oriented, x 4  - No active issues    HEENT:  - No active issues    Respiratory:  - Unlabored respirations on RA, 99% on RA  - No active issues    CV:  #Myocarditis   - Patient presented to ER with c/o chest pain and troponin 653, CKMB 99, CRP 85.7   - EKG revealed ST elevation in I, II, III, aVF, v5, v6, with STD in aVR, v1 v2  - S/p RHC with  and Brilinta 180 mg and heparin load  - Cath showed clean coronaries  - Echo on 11/4: EF 48%, grossly mild segmental left ventricular systolic dysfunction. Hypokinesis of the inferolateral wall and basal inferior wall. No LV thrombus seen.  - Serial EKG to assess for resolution of ST changes  - Continuous cardiac monitoring to monitor for arrhythmias  - Trend cardiac enzymes q6h until peak   - Ibuprofen 600mg three times a day with meals and colchicine 0.6mg q12h  - Tylenol PRN for pain   - Pending cardiac MRI     GI:  - DASH diet  - No active issues    Renal:  - SCr 1.05  - No active issues    Endocrine:  - HbA1c 5.2, lipid panel wnl, TSH 2.04 wnl   - No other active issues    ID:  - Pt with 3 days of fevers, chills, myalgias and exposure to large gathering on Halloween weekend   - Febrile 100.9F in ED  - WBC 16 on admission, now downtrending   - CXR: clear lungs   - F/u cultures  - RVP, COVID PCR, Bordetella negative   - Repeat COVID PCR negative   - Vaccinated with Pfizer x2 in 05/2021     DVT prophylaxis:  Ambuation     Ethics: FULL CODE

## 2021-11-04 NOTE — PROGRESS NOTE ADULT - SUBJECTIVE AND OBJECTIVE BOX
PATIENT: JEANNETTE HARRIS, MRN: [id number]    CHIEF COMPLAINT: Patient is a 31y old  Male who presents with a chief complaint of STEMI (2021 08:14)      INTERVAL HISTORY/OVERNIGHT EVENTS: Patient with minimal chest pain 2/10 last night. Received Tramadol 50mg x1 last night with relief. At bedside, patient has been sleeping and eating well. Currently denies chest pain, SOB, cough, abdominal pain, n/v. Has been ambulating with without assistance. Oriented to person, place, and time. Breathing comfortably on room air.      MEDICATIONS:  MEDICATIONS  (STANDING):  chlorhexidine 4% Liquid 1 Application(s) Topical <User Schedule>  colchicine 0.6 milliGRAM(s) Oral two times a day  ibuprofen  Tablet. 800 milliGRAM(s) Oral three times a day  influenza   Vaccine 0.5 milliLiter(s) IntraMuscular once    MEDICATIONS  (PRN):  acetaminophen     Tablet .. 650 milliGRAM(s) Oral every 6 hours PRN Temp greater or equal to 38C (100.4F), Mild Pain (1 - 3), Moderate Pain (4 - 6)      ALLERGIES: Allergies    No Known Allergies    Intolerances        OBJECTIVE:  ICU Vital Signs Last 24 Hrs  T(C): 37.2 (2021 04:00), Max: 37.4 (2021 19:56)  T(F): 99 (2021 04:00), Max: 99.3 (2021 19:56)  HR: 97 (2021 08:00) (57 - 97)  BP: 81/57 (2021 07:00) (81/57 - 116/79)  BP(mean): 67 (2021 07:00) (67 - 96)  ABP: --  ABP(mean): --  RR: 16 (2021 08:00) (16 - 23)  SpO2: 99% (2021 07:00) (95% - 100%)      Adult Advanced Hemodynamics Last 24 Hrs  CVP(mm Hg): --  CVP(cm H2O): --  CO: --  CI: --  PA: --  PA(mean): --  PCWP: --  SVR: --  SVRI: --  PVR: --  PVRI: --  CAPILLARY BLOOD GLUCOSE        CAPILLARY BLOOD GLUCOSE      POCT Blood Glucose.: 158 mg/dL (2021 04:04)    I&O's Summary    2021 07:01  -  2021 07:00  --------------------------------------------------------  IN: 150 mL / OUT: 1850 mL / NET: -1700 mL    2021 07:01  -  2021 08:48  --------------------------------------------------------  IN: 240 mL / OUT: 0 mL / NET: 240 mL      Daily     Daily Weight in k.1 (2021 05:00)    PHYSICAL EXAMINATION:  General: Comfortable, no acute distress, cooperative with exam.  HEENT: PERRLA, EOMI, moist mucous membranes.  Respiratory: CTAB, normal respiratory effort, no coughing, wheezes, crackles, or rales.  CV: RRR, S1S2, no murmurs, rubs or gallops. No JVD. Distal pulses intact.  Abdominal: Soft, nontender, nondistended, no rebound or guarding, normal bowel sounds.  Neurology: AOx3, no focal neuro defects, AN x 4.  Extremities: No pitting edema, + Peripheral pulses.  Incisions:   Tubes:    LABS:                          12.8   12.21 )-----------( 220      ( 2021 03:56 )             40.8     11-04    137  |  100  |  17  ----------------------------<  109<H>  4.5   |  24  |  1.11    Ca    9.7      2021 03:56  Phos  4.7     11-04  Mg     2.40     -04    TPro  7.5  /  Alb  4.0  /  TBili  0.2  /  DBili  x   /  AST  154<H>  /  ALT  35  /  AlkPhos  75  11-04    LIVER FUNCTIONS - ( 2021 03:56 )  Alb: 4.0 g/dL / Pro: 7.5 g/dL / ALK PHOS: 75 U/L / ALT: 35 U/L / AST: 154 U/L / GGT: x           PT/INR - ( 2021 04:25 )   PT: 13.5 sec;   INR: 1.18 ratio         PTT - ( 2021 04:25 )  PTT:32.0 sec  Creatine Kinase, Serum: 1552 U/L ( @ 03:56)  CKMB Units: 160.9 ng/mL ( @ 03:56)  Creatine Kinase, Serum: 1808 U/L ( @ 22:25)  CKMB Units: 194.8 ng/mL ( @ 22:25)  CKMB Units: 137.2 ng/mL ( @ 15:28)  Creatine Kinase, Serum: 1465 U/L ( @ 15:28)  CKMB Units: 140.6 ng/mL ( @ 12:55)    CARDIAC MARKERS ( 2021 03:56 )  x     / x     / 1552 U/L / x     / 160.9 ng/mL  CARDIAC MARKERS ( 2021 22:25 )  x     / x     / 1808 U/L / x     / 194.8 ng/mL  CARDIAC MARKERS ( 2021 15:28 )  x     / x     / 1465 U/L / x     / 137.2 ng/mL  CARDIAC MARKERS ( 2021 12:55 )  x     / x     / x     / x     / 140.6 ng/mL  CARDIAC MARKERS ( 2021 06:20 )  x     / x     / 919 U/L / x     / 99.0 ng/mL      Urinalysis Basic - ( 2021 18:20 )    Color: Light Yellow / Appearance: Clear / S.017 / pH: x  Gluc: x / Ketone: Negative  / Bili: Negative / Urobili: <2 mg/dL   Blood: x / Protein: Negative / Nitrite: Negative   Leuk Esterase: Negative / RBC: x / WBC x   Sq Epi: x / Non Sq Epi: x / Bacteria: x        TELEMETRY:     EKG:     IMAGING:

## 2021-11-05 ENCOUNTER — TRANSCRIPTION ENCOUNTER (OUTPATIENT)
Age: 31
End: 2021-11-05

## 2021-11-05 LAB
ALBUMIN SERPL ELPH-MCNC: 4.1 G/DL — SIGNIFICANT CHANGE UP (ref 3.3–5)
ALP SERPL-CCNC: 75 U/L — SIGNIFICANT CHANGE UP (ref 40–120)
ALT FLD-CCNC: 31 U/L — SIGNIFICANT CHANGE UP (ref 4–41)
ANION GAP SERPL CALC-SCNC: 11 MMOL/L — SIGNIFICANT CHANGE UP (ref 7–14)
AST SERPL-CCNC: 56 U/L — HIGH (ref 4–40)
BILIRUB SERPL-MCNC: 0.4 MG/DL — SIGNIFICANT CHANGE UP (ref 0.2–1.2)
BUN SERPL-MCNC: 16 MG/DL — SIGNIFICANT CHANGE UP (ref 7–23)
CALCIUM SERPL-MCNC: 9.2 MG/DL — SIGNIFICANT CHANGE UP (ref 8.4–10.5)
CHLORIDE SERPL-SCNC: 103 MMOL/L — SIGNIFICANT CHANGE UP (ref 98–107)
CK MB BLD-MCNC: 6.7 % — HIGH (ref 0–2.5)
CK MB BLD-MCNC: NEGATIVE TITER — SIGNIFICANT CHANGE UP
CK MB CFR SERPL CALC: 33.4 NG/ML — HIGH
CK SERPL-CCNC: 497 U/L — HIGH (ref 30–200)
CO2 SERPL-SCNC: 24 MMOL/L — SIGNIFICANT CHANGE UP (ref 22–31)
COXSACKIE TYPE A-24: NEGATIVE TITER — SIGNIFICANT CHANGE UP
CREAT SERPL-MCNC: 1 MG/DL — SIGNIFICANT CHANGE UP (ref 0.5–1.3)
CV A24 IGG TITR SER IF: NEGATIVE TITER — SIGNIFICANT CHANGE UP
CV A7 AB SER-ACNC: NEGATIVE TITER — SIGNIFICANT CHANGE UP
CV A9 AB TITR FLD: NEGATIVE TITER — SIGNIFICANT CHANGE UP
GLUCOSE SERPL-MCNC: 95 MG/DL — SIGNIFICANT CHANGE UP (ref 70–99)
HCT VFR BLD CALC: 41.8 % — SIGNIFICANT CHANGE UP (ref 39–50)
HGB BLD-MCNC: 12.9 G/DL — LOW (ref 13–17)
MAGNESIUM SERPL-MCNC: 2.2 MG/DL — SIGNIFICANT CHANGE UP (ref 1.6–2.6)
MCHC RBC-ENTMCNC: 22.4 PG — LOW (ref 27–34)
MCHC RBC-ENTMCNC: 30.9 GM/DL — LOW (ref 32–36)
MCV RBC AUTO: 72.6 FL — LOW (ref 80–100)
NRBC # BLD: 0 /100 WBCS — SIGNIFICANT CHANGE UP
NRBC # FLD: 0 K/UL — SIGNIFICANT CHANGE UP
PHOSPHATE SERPL-MCNC: 3.7 MG/DL — SIGNIFICANT CHANGE UP (ref 2.5–4.5)
PLATELET # BLD AUTO: 245 K/UL — SIGNIFICANT CHANGE UP (ref 150–400)
POTASSIUM SERPL-MCNC: 4.3 MMOL/L — SIGNIFICANT CHANGE UP (ref 3.5–5.3)
POTASSIUM SERPL-SCNC: 4.3 MMOL/L — SIGNIFICANT CHANGE UP (ref 3.5–5.3)
PROT SERPL-MCNC: 7.9 G/DL — SIGNIFICANT CHANGE UP (ref 6–8.3)
RBC # BLD: 5.76 M/UL — SIGNIFICANT CHANGE UP (ref 4.2–5.8)
RBC # FLD: 16 % — HIGH (ref 10.3–14.5)
SODIUM SERPL-SCNC: 138 MMOL/L — SIGNIFICANT CHANGE UP (ref 135–145)
TROPONIN T, HIGH SENSITIVITY RESULT: 2691 NG/L — CRITICAL HIGH
TROPONIN T, HIGH SENSITIVITY RESULT: 3390 NG/L — CRITICAL HIGH
WBC # BLD: 14.14 K/UL — HIGH (ref 3.8–10.5)
WBC # FLD AUTO: 14.14 K/UL — HIGH (ref 3.8–10.5)

## 2021-11-05 PROCEDURE — 99233 SBSQ HOSP IP/OBS HIGH 50: CPT

## 2021-11-05 RX ORDER — LOSARTAN POTASSIUM 100 MG/1
12.5 TABLET, FILM COATED ORAL ONCE
Refills: 0 | Status: DISCONTINUED | OUTPATIENT
Start: 2021-11-05 | End: 2021-11-05

## 2021-11-05 RX ORDER — BENZOCAINE AND MENTHOL 5; 1 G/100ML; G/100ML
1 LIQUID ORAL ONCE
Refills: 0 | Status: COMPLETED | OUTPATIENT
Start: 2021-11-05 | End: 2021-11-05

## 2021-11-05 RX ORDER — TRAMADOL HYDROCHLORIDE 50 MG/1
25 TABLET ORAL ONCE
Refills: 0 | Status: DISCONTINUED | OUTPATIENT
Start: 2021-11-05 | End: 2021-11-05

## 2021-11-05 RX ORDER — LOSARTAN POTASSIUM 100 MG/1
12.5 TABLET, FILM COATED ORAL ONCE
Refills: 0 | Status: COMPLETED | OUTPATIENT
Start: 2021-11-05 | End: 2021-11-05

## 2021-11-05 RX ADMIN — LOSARTAN POTASSIUM 12.5 MILLIGRAM(S): 100 TABLET, FILM COATED ORAL at 12:35

## 2021-11-05 RX ADMIN — ENOXAPARIN SODIUM 40 MILLIGRAM(S): 100 INJECTION SUBCUTANEOUS at 12:35

## 2021-11-05 RX ADMIN — Medication 800 MILLIGRAM(S): at 13:30

## 2021-11-05 RX ADMIN — PANTOPRAZOLE SODIUM 40 MILLIGRAM(S): 20 TABLET, DELAYED RELEASE ORAL at 05:44

## 2021-11-05 RX ADMIN — Medication 0.6 MILLIGRAM(S): at 18:43

## 2021-11-05 RX ADMIN — Medication 12.5 MILLIGRAM(S): at 18:00

## 2021-11-05 RX ADMIN — Medication 800 MILLIGRAM(S): at 12:37

## 2021-11-05 RX ADMIN — Medication 650 MILLIGRAM(S): at 09:00

## 2021-11-05 RX ADMIN — Medication 100 MILLIGRAM(S): at 06:42

## 2021-11-05 RX ADMIN — Medication 800 MILLIGRAM(S): at 23:14

## 2021-11-05 RX ADMIN — CHLORHEXIDINE GLUCONATE 1 APPLICATION(S): 213 SOLUTION TOPICAL at 12:35

## 2021-11-05 RX ADMIN — Medication 800 MILLIGRAM(S): at 06:45

## 2021-11-05 RX ADMIN — Medication 12.5 MILLIGRAM(S): at 05:45

## 2021-11-05 RX ADMIN — Medication 650 MILLIGRAM(S): at 08:00

## 2021-11-05 RX ADMIN — Medication 800 MILLIGRAM(S): at 22:14

## 2021-11-05 RX ADMIN — BENZOCAINE AND MENTHOL 1 LOZENGE: 5; 1 LIQUID ORAL at 06:45

## 2021-11-05 RX ADMIN — Medication 800 MILLIGRAM(S): at 05:44

## 2021-11-05 RX ADMIN — Medication 0.6 MILLIGRAM(S): at 05:45

## 2021-11-05 NOTE — PROGRESS NOTE ADULT - SUBJECTIVE AND OBJECTIVE BOX
PATIENT: JEANNETTE HARRIS, MRN: [id number]    CHIEF COMPLAINT: Patient is a 31y old  Male who presents with a chief complaint of STEMI (2021 08:40)      INTERVAL HISTORY/OVERNIGHT EVENTS: Patient complaining of sore throat, given lozenges and Robitussin. Patient seen and examined at bedside this AM. Patient eating and sleeping well. Notes pleuritic chest pain this morning. Denies headache, nausea, vomiting, abdominal pain. Breathing comfortably on room air.   Tele: NSR 80s-90s      MEDICATIONS:  MEDICATIONS  (STANDING):  chlorhexidine 4% Liquid 1 Application(s) Topical <User Schedule>  colchicine 0.6 milliGRAM(s) Oral two times a day  enoxaparin Injectable 40 milliGRAM(s) SubCutaneous daily  ibuprofen  Tablet. 800 milliGRAM(s) Oral three times a day  influenza   Vaccine 0.5 milliLiter(s) IntraMuscular once  metoprolol tartrate 12.5 milliGRAM(s) Oral every 12 hours  pantoprazole    Tablet 40 milliGRAM(s) Oral before breakfast    MEDICATIONS  (PRN):  acetaminophen     Tablet .. 650 milliGRAM(s) Oral every 6 hours PRN Temp greater or equal to 38C (100.4F), Mild Pain (1 - 3), Moderate Pain (4 - 6)  guaiFENesin Oral Liquid (Sugar-Free) 100 milliGRAM(s) Oral every 6 hours PRN Cough      ALLERGIES: Allergies    No Known Allergies    Intolerances        OBJECTIVE:  ICU Vital Signs Last 24 Hrs  T(C): 36.9 (2021 04:00), Max: 36.9 (2021 04:00)  T(F): 98.4 (2021 04:00), Max: 98.4 (2021 04:00)  HR: 75 (2021 06:00) (51 - 110)  BP: 107/82 (2021 06:00) (82/41 - 114/98)  BP(mean): 90 (2021 06:00) (53 - 104)  ABP: --  ABP(mean): --  RR: 18 (2021 06:00) (15 - 27)  SpO2: 100% (2021 06:00) (100% - 100%)      Adult Advanced Hemodynamics Last 24 Hrs  CVP(mm Hg): --  CVP(cm H2O): --  CO: --  CI: --  PA: --  PA(mean): --  PCWP: --  SVR: --  SVRI: --  PVR: --  PVRI: --  CAPILLARY BLOOD GLUCOSE        CAPILLARY BLOOD GLUCOSE        I&O's Summary    2021 07:01  -  2021 07:00  --------------------------------------------------------  IN: 780 mL / OUT: 0 mL / NET: 780 mL      Daily     Daily Weight in k.2 (2021 05:00)    PHYSICAL EXAMINATION:  General: Comfortable, no acute distress, cooperative with exam.  HEENT: PERRLA, EOMI, moist mucous membranes.  Respiratory: CTAB, normal respiratory effort, no coughing, wheezes, crackles, or rales.  CV: RRR, S1S2, no murmurs, rubs or gallops. No JVD. Distal pulses intact.  Abdominal: Soft, nontender, nondistended, no rebound or guarding, normal bowel sounds.  Neurology: AOx3, no focal neuro defects, AN x 4.  Extremities: No pitting edema, + Peripheral pulses.  Incisions:   Tubes:    LABS:                          12.9   14.14 )-----------( 245      ( 2021 06:41 )             41.8     11-    138  |  103  |  16  ----------------------------<  95  4.3   |  24  |  1.00    Ca    9.2      2021 06:41  Phos  3.7     11-  Mg     2.20     -    TPro  7.9  /  Alb  4.1  /  TBili  0.4  /  DBili  x   /  AST  56<H>  /  ALT  31  /  AlkPhos  75  11-05    LIVER FUNCTIONS - ( 2021 06:41 )  Alb: 4.1 g/dL / Pro: 7.9 g/dL / ALK PHOS: 75 U/L / ALT: 31 U/L / AST: 56 U/L / GGT: x             Creatine Kinase, Serum: 497 U/L ( @ 06:41)  CKMB Units: 33.4 ng/mL ( @ 06:41)    CARDIAC MARKERS ( 2021 06:41 )  x     / x     / 497 U/L / x     / 33.4 ng/mL  CARDIAC MARKERS ( 2021 03:56 )  x     / x     / 1552 U/L / x     / 160.9 ng/mL  CARDIAC MARKERS ( 2021 22:25 )  x     / x     / 1808 U/L / x     / 194.8 ng/mL  CARDIAC MARKERS ( 2021 15:28 )  x     / x     / 1465 U/L / x     / 137.2 ng/mL  CARDIAC MARKERS ( 2021 12:55 )  x     / x     / x     / x     / 140.6 ng/mL      Urinalysis Basic - ( 2021 18:20 )    Color: Light Yellow / Appearance: Clear / S.017 / pH: x  Gluc: x / Ketone: Negative  / Bili: Negative / Urobili: <2 mg/dL   Blood: x / Protein: Negative / Nitrite: Negative   Leuk Esterase: Negative / RBC: x / WBC x   Sq Epi: x / Non Sq Epi: x / Bacteria: x        TELEMETRY:     EKG:     IMAGING:     ECHO 11/3  EF 48%  1. Normal mitral valve. Minimal mitral regurgitation.  2. Normal left ventricular internal dimensions and wall thicknesses.  3. Endocardium not well visualized; grossly mild segmental left ventricular systolic dysfunction.  Hypokinesis of the inferolateral wall and basal inferior wall.  Endocardial visualization enhanced with intravenous injection of echo contrast (Definity).  No LV thrombus seen.  4. The right ventricle is not well visualized; grossly normal right ventricular systolic function.    Cardiac MRI   The LV is normal in size with decreased systolic function; LVEF: 41%. RV decreased systolic function. No LV thrombus. Foci of patchy subepicardial LGE suggestive of myocarditis.

## 2021-11-05 NOTE — DISCHARGE NOTE PROVIDER - NSDCCPTREATMENT_GEN_ALL_CORE_FT
PRINCIPAL PROCEDURE  Procedure: Transthoracic echocardiogram  Findings and Treatment: DIMENSIONS:  Dimensions:     Normal Values:  LA:     3.1 cm    2.0 - 4.0 cm  Ao:     3.4 cm    2.0 - 3.8 cm  SEPTUM: 0.7 cm    0.6 - 1.2 cm  PWT:    0.7 cm    0.6 - 1.1 cm  LVIDd:  5.0 cm    3.0 - 5.6 cm  LVIDs:  3.7 cm    1.8 - 4.0 cm  Derived Variables:  LVMI: 59 g/m2  RWT: 0.28  Fractional short: 26 %  Ejection Fraction (Cope Rule): 48 %  ------------------------------------------------------------------------  OBSERVATIONS:  Mitral Valve: Normal mitral valve. Minimal mitral regurgitation.  Aortic Root: Normal aortic root.  Aortic Valve: Aortic valve leaflet morphology not well visualized.  Left Atrium: Normal left atrium.  Left Ventricle: Endocardium not well visualized; grossly mild segmental left ventricular systolic dysfunction.  Hypokinesis of the inferolateral wall and basal inferior wall.  Endocardial visualization enhanced with intravenous injection of echo contrast (Definity).  No LV thrombus seen. Normal left ventricular internal dimensions and wall thicknesses.  Right Heart: Normal right atrium. The right ventricle is not well visualized; grossly normal right ventricular  systolic function. Normal tricuspid valve. Minimal tricuspid regurgitation. Normal pulmonic valve.  Pericardium/Pleura: Normal pericardium with no pericardial effusion.  ------------------------------------------------------------------------  CONCLUSIONS:  1. Normal mitral valve. Minimal mitral regurgitation.  2. Normal left ventricular internal dimensions and wall thicknesses.  3. Endocardium not well visualized; grossly mild segmental left ventricular systolic dysfunction. Hypokinesis of the inferolateral wall and basal inferior wall. Endocardial visualization enhanced with intravenous injection of echo contrast (Definity). No LV thrombus seen.  4. The right ventricle is not well visualized; grossly normal right ventricular systolic function.

## 2021-11-05 NOTE — PROGRESS NOTE ADULT - ASSESSMENT
32 yo male with no PMH presenting to the ED with chest pain and a few days of fever, chills, myalgias admitted to the hospital for GHADA in I, II, III, aVF, V4-V6 and STD in avR, V1, V2. S/p RHC with no blockages seen. 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 Echo with EF 48% and mild segmental LV systolic dysfunction. Cardiac MRI showing EF 41% and decreased LV and RV systolic dysfunction.     Plan:    Neuro:  - Awake, alert, oriented, x 4  - No active issues    HEENT:  - No active issues    Respiratory:  - Unlabored respirations on RA, 99% on RA  - No active issues    CV:  #Fauzia-myocarditis   - Patient presented to ER with c/o chest pain and troponin 653, CKMB 99, CRP 85.7   - EKG revealed ST elevation in I, II, III, aVF, v5, v6, with STD in aVR, v1 v2  - S/p  and Brilinta 180 mg and heparin load  - S/p RHC that showed clean coronaries  - Echo on 11/4: EF 48%, grossly mild segmental left ventricular systolic dysfunction. Hypokinesis of the inferolateral wall and basal inferior wall. No LV thrombus seen.  - Serial EKG to assess for resolution of ST changes  - Continuous cardiac monitoring to monitor for arrhythmias  - Trend cardiac enzymes q6h until peak (CKMB peaked at 194 and CK peaked at 1808 on 11/3)  - Ibuprofen 600mg three times a day with meals and colchicine 0.6mg q12h  - Tylenol PRN for pain   - Cardiac MRI showing EF 41% and decreased LV and RV systolic dysfunction. No LV thrombus. Foci of patchy subepicardial LGE suggestive of myocarditis.  - Started Metoprolol 12.5mg BID     GI:  - DASH diet  - Protonix 40mg daily   - No active issues    Renal:  - SCr 1.05  - No active issues    Endocrine:  - HbA1c 5.2, lipid panel wnl, TSH 2.04 wnl   - No other active issues    ID:  - Pt with 3 days of fevers, chills, myalgias and exposure to large gathering on Halloween weekend   - Febrile 100.9F in ED  - WBC 16 on admission, now downtrending   - CXR: clear lungs   - Blood cx 11/3: NGTD   - RVP, COVID PCR, Bordetella negative   - Repeat COVID PCR negative   - Vaccinated with Pfizer x2 in 05/2021     DVT prophylaxis: Lovenox 40mg     Ethics: FULL CODE

## 2021-11-05 NOTE — DISCHARGE NOTE PROVIDER - NSFOLLOWUPCLINICS_GEN_ALL_ED_FT
[facility]  Cardiology  [address] [place] [id number]  Phone: [phone]  Fax:   Follow Up Time: 2 weeks

## 2021-11-05 NOTE — DISCHARGE NOTE PROVIDER - NSDCMRMEDTOKEN_GEN_ALL_CORE_FT
colchicine 0.6 mg oral tablet: 1 tab(s) orally 2 times a day  ibuprofen 800 mg oral tablet: 1 tab(s) orally 3 times a day  losartan 25 mg oral tablet: 0.5 tab(s) orally once a day   metoprolol succinate 25 mg oral capsule, extended release: 1 cap(s) orally once a day  pantoprazole 40 mg oral delayed release tablet: 1 tab(s) orally once a day (before a meal)

## 2021-11-05 NOTE — DISCHARGE NOTE PROVIDER - NSDCCPCAREPLAN_GEN_ALL_CORE_FT
PRINCIPAL DISCHARGE DIAGNOSIS  Diagnosis: Viral myocarditis  Assessment and Plan of Treatment: You came to the hospital for severe chest pain. Your EKG showed changes concerning for a heart attack. You were taken to the catherization lab to examine the arteries of your heart. The arteries of your heart did not show any blockages that would cause chest pain. An ultrasound of the heart was performed, showing decreased function of the heart muscle. The cardiac MRI also confirmed the decreased heart function and the diagnosis of myocarditis. With your history of a recent flu-like illness, we believe that your myocarditis was caused by a virus. Myocarditis is an inflammation of the muscle of your heart (myocardium). With myocarditis, the heart muscle can become damaged. This weakens the heart and makes it work harder. Over time, this may cause your heart to enlarge, lead to heart failure, and become life-threatening.  --  How can I manage my symptoms?   - Limit physical activity until your symptoms decrease. Avoid heavy lifting or certain physical activities.   - Limit foods high in cholesterol and salt. Eat foods that help protect the heart, including plenty of fruits and vegetables, nuts, and sources of fiber. Eat foods that contain healthy fats, such as walnuts, salmon, and canola and soybean oils.   - Limit alcohol. Women should limit alcohol to 1 drink a day. Men should limit alcohol to 2 drinks a day.   - Do not smoke. Nicotine and other chemicals in cigarettes can cause damage to your heart.   --  You were started on medicines during your hospitalization. Please take them as directed and follow up with a cardiologist appointment within 2 weeks.       PRINCIPAL DISCHARGE DIAGNOSIS  Diagnosis: Viral myocarditis  Assessment and Plan of Treatment: You came to the hospital for severe chest pain. Your EKG showed changes concerning for a heart attack. You were taken to the catherization lab to examine the arteries of your heart. The arteries of your heart did not show any blockages that would cause chest pain. An ultrasound of the heart was performed, showing decreased function of the heart muscle. The cardiac MRI also confirmed the decreased heart function and the diagnosis of myocarditis. With your history of a recent flu-like illness, we believe that your myocarditis was caused by a virus. Myocarditis is an inflammation of the muscle of your heart (myocardium). With myocarditis, the heart muscle can become damaged. This weakens the heart and makes it work harder.   --  How can I manage my symptoms?   - Limit physical activity until your symptoms decrease. Avoid heavy lifting or certain physical activities.   - Limit foods high in cholesterol and salt. Eat foods that help protect the heart, including plenty of fruits and vegetables, nuts, and sources of fiber. Eat foods that contain healthy fats, such as walnuts, salmon, and canola and soybean oils.   - Limit alcohol. Women should limit alcohol to 1 drink a day. Men should limit alcohol to 2 drinks a day.   - Do not smoke. Nicotine and other chemicals in cigarettes can cause damage to your heart.   --  You were started on medicines during your hospitalization. Please take them as directed and follow up with a cardiologist appointment within 2 weeks.

## 2021-11-05 NOTE — DISCHARGE NOTE PROVIDER - HOSPITAL COURSE
32 yo male with no PMH presenting to the ED with chest pain and a few days of fever, chills, myalgias admitted to the hospital for GHADA in I, II, III, aVF, V4-V6 and STD in avR, V1, V2. S/p RHC with no blockages seen. Echo on 11/3 showing EF 48% and gross mild segmental left ventricular systolic dysfunction. Hypokinesis of the inferolateral wall and basal inferior wall. No LV thrombus seen. Patient started on Colchicine 0.6mg BID and Ibuprofen 800mg TID with meals. Also started on Protonix 40mg daily before breakfast while on Ibuprofen. Tylenol for breakthrough chest pain. Cardiac enzymes trended to peak. Cardiac MRI on 11/4 confirming myocarditis and decreased LV and RV systolic function with EF 41%. For goal-directed medical therapy for heart failure, patient also started on Metoprolol 12.5mg BID and Losartan 12.5mg. To be discharged on Metoprolol 12.5mg daily in AM and Losartan 12.5mg daily in PM. Follow up with cardiologist in 2 weeks.

## 2021-11-05 NOTE — DISCHARGE NOTE PROVIDER - CARE PROVIDER_API CALL
Daphnie Gould)  Cardiovascular Disease  [facility]-Dept of Cardiology, [address], [address]  [place] [address]  Phone: [phone]  Fax: [phone]  Follow Up Time: 2 weeks

## 2021-11-06 ENCOUNTER — TRANSCRIPTION ENCOUNTER (OUTPATIENT)
Age: 31
End: 2021-11-06

## 2021-11-06 VITALS — RESPIRATION RATE: 20 BRPM | HEART RATE: 76 BPM

## 2021-11-06 LAB
ALBUMIN SERPL ELPH-MCNC: 4.2 G/DL — SIGNIFICANT CHANGE UP (ref 3.3–5)
ALP SERPL-CCNC: 71 U/L — SIGNIFICANT CHANGE UP (ref 40–120)
ALT FLD-CCNC: 33 U/L — SIGNIFICANT CHANGE UP (ref 4–41)
ANION GAP SERPL CALC-SCNC: 11 MMOL/L — SIGNIFICANT CHANGE UP (ref 7–14)
AST SERPL-CCNC: 43 U/L — HIGH (ref 4–40)
BILIRUB SERPL-MCNC: 0.2 MG/DL — SIGNIFICANT CHANGE UP (ref 0.2–1.2)
BUN SERPL-MCNC: 12 MG/DL — SIGNIFICANT CHANGE UP (ref 7–23)
CALCIUM SERPL-MCNC: 9.3 MG/DL — SIGNIFICANT CHANGE UP (ref 8.4–10.5)
CHLORIDE SERPL-SCNC: 101 MMOL/L — SIGNIFICANT CHANGE UP (ref 98–107)
CO2 SERPL-SCNC: 24 MMOL/L — SIGNIFICANT CHANGE UP (ref 22–31)
CREAT SERPL-MCNC: 1 MG/DL — SIGNIFICANT CHANGE UP (ref 0.5–1.3)
GLUCOSE SERPL-MCNC: 107 MG/DL — HIGH (ref 70–99)
HCT VFR BLD CALC: 42.2 % — SIGNIFICANT CHANGE UP (ref 39–50)
HGB BLD-MCNC: 13.3 G/DL — SIGNIFICANT CHANGE UP (ref 13–17)
MAGNESIUM SERPL-MCNC: 2.2 MG/DL — SIGNIFICANT CHANGE UP (ref 1.6–2.6)
MCHC RBC-ENTMCNC: 22.5 PG — LOW (ref 27–34)
MCHC RBC-ENTMCNC: 31.5 GM/DL — LOW (ref 32–36)
MCV RBC AUTO: 71.3 FL — LOW (ref 80–100)
NRBC # BLD: 0 /100 WBCS — SIGNIFICANT CHANGE UP
NRBC # FLD: 0 K/UL — SIGNIFICANT CHANGE UP
PHOSPHATE SERPL-MCNC: 3.7 MG/DL — SIGNIFICANT CHANGE UP (ref 2.5–4.5)
PLATELET # BLD AUTO: 308 K/UL — SIGNIFICANT CHANGE UP (ref 150–400)
POTASSIUM SERPL-MCNC: 4.2 MMOL/L — SIGNIFICANT CHANGE UP (ref 3.5–5.3)
POTASSIUM SERPL-SCNC: 4.2 MMOL/L — SIGNIFICANT CHANGE UP (ref 3.5–5.3)
PROT SERPL-MCNC: 7.5 G/DL — SIGNIFICANT CHANGE UP (ref 6–8.3)
RBC # BLD: 5.92 M/UL — HIGH (ref 4.2–5.8)
RBC # FLD: 15.7 % — HIGH (ref 10.3–14.5)
SODIUM SERPL-SCNC: 136 MMOL/L — SIGNIFICANT CHANGE UP (ref 135–145)
TROPONIN T, HIGH SENSITIVITY RESULT: 1827 NG/L — CRITICAL HIGH
WBC # BLD: 11.91 K/UL — HIGH (ref 3.8–10.5)
WBC # FLD AUTO: 11.91 K/UL — HIGH (ref 3.8–10.5)

## 2021-11-06 PROCEDURE — 99233 SBSQ HOSP IP/OBS HIGH 50: CPT | Mod: GC

## 2021-11-06 RX ORDER — COLCHICINE 0.6 MG
1 TABLET ORAL
Qty: 28 | Refills: 0
Start: 2021-11-06 | End: 2021-11-19

## 2021-11-06 RX ORDER — PANTOPRAZOLE SODIUM 20 MG/1
1 TABLET, DELAYED RELEASE ORAL
Qty: 30 | Refills: 0
Start: 2021-11-06 | End: 2021-12-05

## 2021-11-06 RX ORDER — LOSARTAN POTASSIUM 100 MG/1
12.5 TABLET, FILM COATED ORAL DAILY
Refills: 0 | Status: DISCONTINUED | OUTPATIENT
Start: 2021-11-06 | End: 2021-11-06

## 2021-11-06 RX ORDER — METOPROLOL TARTRATE 50 MG
1 TABLET ORAL
Qty: 30 | Refills: 0
Start: 2021-11-06 | End: 2021-12-05

## 2021-11-06 RX ORDER — LOSARTAN POTASSIUM 100 MG/1
0.5 TABLET, FILM COATED ORAL
Qty: 15 | Refills: 0
Start: 2021-11-06 | End: 2021-12-05

## 2021-11-06 RX ORDER — IBUPROFEN 200 MG
1 TABLET ORAL
Qty: 42 | Refills: 0
Start: 2021-11-06 | End: 2021-11-19

## 2021-11-06 RX ADMIN — Medication 0.6 MILLIGRAM(S): at 06:09

## 2021-11-06 RX ADMIN — Medication 800 MILLIGRAM(S): at 07:30

## 2021-11-06 RX ADMIN — Medication 800 MILLIGRAM(S): at 12:15

## 2021-11-06 RX ADMIN — LOSARTAN POTASSIUM 12.5 MILLIGRAM(S): 100 TABLET, FILM COATED ORAL at 11:00

## 2021-11-06 RX ADMIN — Medication 800 MILLIGRAM(S): at 06:09

## 2021-11-06 RX ADMIN — Medication 12.5 MILLIGRAM(S): at 06:09

## 2021-11-06 RX ADMIN — PANTOPRAZOLE SODIUM 40 MILLIGRAM(S): 20 TABLET, DELAYED RELEASE ORAL at 06:09

## 2021-11-06 NOTE — DISCHARGE NOTE NURSING/CASE MANAGEMENT/SOCIAL WORK - PATIENT PORTAL LINK FT
You can access the FollowMyHealth Patient Portal offered by [facility] by registering at the following website: [email or web address]. By joining [facility]’s FollowMyHealth portal, you will also be able to view your health information using other applications (apps) compatible with our system.

## 2021-11-06 NOTE — PROGRESS NOTE ADULT - TIME-BASED BILLING (NON-CRITICAL CARE)
Telephone Encounter by Jelena Judge at 03/29/17 09:24 AM     Author:  Jelena Judge Service:  (none) Author Type:       Filed:  03/29/17 09:25 AM Encounter Date:  3/29/2017 Status:  Signed     :  Jelena Judge ()            Epic order dated 3/28/2017 for[CC1.1M] bilateral knee pain[CC1.1C]    Time frame Next available      Left message to return call to schedule in orthopedic department.  1st attempt[CC1.1M]      Revision History        User Key Date/Time User Provider Type Action    > CC1.1 03/29/17 09:25 AM Jelena Judge  Sign    C - Copied, M - Manual            
Telephone Encounter by Jelena Judge at 03/31/17 09:39 AM     Author:  Jelena Judge Service:  (none) Author Type:       Filed:  03/31/17 09:39 AM Encounter Date:  3/29/2017 Status:  Signed     :  Jelena Judge ()            Left message to return call to schedule in orthopedic department. 2nd attempt[CC1.1M]      Revision History        User Key Date/Time User Provider Type Action    > CC1.1 03/31/17 09:39 AM Jelena Judge  Sign    M - Manual            
Telephone Encounter by Ruth Ann Fernandez at 04/04/17 10:35 AM     Author:  Ruth Ann Fernandez Service:  (none) Author Type:  Patient      Filed:  04/04/17 10:36 AM Encounter Date:  3/29/2017 Status:  Signed     :  Ruth Ann Fernandez (Patient )            Left message to call and schedule appointment with our orthopedic department. 3rd attempt letter mailed close encounter[EJ1.1M]       Revision History        User Key Date/Time User Provider Type Action    > EJ1.1 04/04/17 10:36 AM Ruth Ann Fernandez Patient  Sign    M - Manual            
Time-based billing (NON-critical care)

## 2021-11-06 NOTE — PROGRESS NOTE ADULT - SUBJECTIVE AND OBJECTIVE BOX
Subjective/Objective:     MEDICATIONS  (STANDING):  chlorhexidine 4% Liquid 1 Application(s) Topical <User Schedule>  colchicine 0.6 milliGRAM(s) Oral two times a day  enoxaparin Injectable 40 milliGRAM(s) SubCutaneous daily  ibuprofen  Tablet. 800 milliGRAM(s) Oral three times a day  influenza   Vaccine 0.5 milliLiter(s) IntraMuscular once  losartan 12.5 milliGRAM(s) Oral daily  metoprolol tartrate 12.5 milliGRAM(s) Oral every 12 hours  pantoprazole    Tablet 40 milliGRAM(s) Oral before breakfast    MEDICATIONS  (PRN):  acetaminophen     Tablet .. 650 milliGRAM(s) Oral every 6 hours PRN Temp greater or equal to 38C (100.4F), Mild Pain (1 - 3), Moderate Pain (4 - 6)  guaiFENesin Oral Liquid (Sugar-Free) 100 milliGRAM(s) Oral every 6 hours PRN Cough          Vital Signs Last 24 Hrs  T(C): 36.9 (06 Nov 2021 04:00), Max: 36.9 (05 Nov 2021 19:54)  T(F): 98.4 (06 Nov 2021 04:00), Max: 98.5 (06 Nov 2021 00:00)  HR: 66 (06 Nov 2021 06:00) (66 - 99)  BP: 100/59 (06 Nov 2021 05:00) (91/53 - 104/74)  BP(mean): 73 (06 Nov 2021 05:00) (65 - 89)  RR: 19 (06 Nov 2021 06:00) (17 - 28)  SpO2: 100% (06 Nov 2021 06:00) (97% - 100%)  I&O's Detail    05 Nov 2021 07:01  -  06 Nov 2021 07:00  --------------------------------------------------------  IN:    Oral Fluid: 720 mL  Total IN: 720 mL    OUT:  Total OUT: 0 mL    Total NET: 720 mL            PHYSICAL EXAM  GEN:  RESP:  CV:  GI:  EXT:  NEURO:  PSYCH:        EKG/ TELEM:    LABS:                          13.3   11.91 )-----------( 308      ( 06 Nov 2021 06:43 )             42.2       06 Nov 2021 06:43    136    |  101    |  12     ----------------------------<  107<H>  4.2     |  24     |  1.00   05 Nov 2021 06:41    138    |  103    |  16     ----------------------------<  95     4.3     |  24     |  1.00     Ca    9.3        06 Nov 2021 06:43  Ca    9.2        05 Nov 2021 06:41  Phos  3.7       06 Nov 2021 06:43  Phos  3.7       05 Nov 2021 06:41  Mg     2.20      06 Nov 2021 06:43  Mg     2.20      05 Nov 2021 06:41    TPro  7.5    /  Alb  4.2    /  TBili  0.2    /  DBili  x      /  AST  43<H>  /  ALT  33     /  AlkPhos  71     06 Nov 2021 06:43  TPro  7.9    /  Alb  4.1    /  TBili  0.4    /  DBili  x      /  AST  56<H>  /  ALT  31     /  AlkPhos  75     05 Nov 2021 06:41    CARDIAC MARKERS ( 05 Nov 2021 06:41 )  x     / x     / 497 U/L / x     / 33.4 ng/mL        Creatine Kinase, Serum: 497 U/L (11-05-21 @ 06:41)  Creatine Kinase, Serum: 1552 U/L (11-04-21 @ 03:56)  Creatine Kinase, Serum: 1808 U/L (11-03-21 @ 22:25)  Creatine Kinase, Serum: 1465 U/L (11-03-21 @ 15:28)              Diagnostic testing:        Assessment and Plan:         Subjective/Objective: Resting in bed, denies chest pain or SOB.    MEDICATIONS  (STANDING):  chlorhexidine 4% Liquid 1 Application(s) Topical <User Schedule>  colchicine 0.6 milliGRAM(s) Oral two times a day  enoxaparin Injectable 40 milliGRAM(s) SubCutaneous daily  ibuprofen  Tablet. 800 milliGRAM(s) Oral three times a day  influenza   Vaccine 0.5 milliLiter(s) IntraMuscular once  losartan 12.5 milliGRAM(s) Oral daily  metoprolol tartrate 12.5 milliGRAM(s) Oral every 12 hours  pantoprazole    Tablet 40 milliGRAM(s) Oral before breakfast    MEDICATIONS  (PRN):  acetaminophen     Tablet .. 650 milliGRAM(s) Oral every 6 hours PRN Temp greater or equal to 38C (100.4F), Mild Pain (1 - 3), Moderate Pain (4 - 6)  guaiFENesin Oral Liquid (Sugar-Free) 100 milliGRAM(s) Oral every 6 hours PRN Cough          Vital Signs Last 24 Hrs  T(C): 36.9 (06 Nov 2021 04:00), Max: 36.9 (05 Nov 2021 19:54)  T(F): 98.4 (06 Nov 2021 04:00), Max: 98.5 (06 Nov 2021 00:00)  HR: 66 (06 Nov 2021 06:00) (66 - 99)  BP: 100/59 (06 Nov 2021 05:00) (91/53 - 104/74)  BP(mean): 73 (06 Nov 2021 05:00) (65 - 89)  RR: 19 (06 Nov 2021 06:00) (17 - 28)  SpO2: 100% (06 Nov 2021 06:00) (97% - 100%)  I&O's Detail    05 Nov 2021 07:01  -  06 Nov 2021 07:00  --------------------------------------------------------  IN:    Oral Fluid: 720 mL  Total IN: 720 mL    OUT:  Total OUT: 0 mL    Total NET: 720 mL    PHYSICAL EXAM  GEN: NAD, skin W & D  RESP: CTA B/L  CV: nl S1S2, no M/R/C  GI: soft, NT/ND, BS +  EXT: no C/C/E  NEURO: A & O X 3    EKG/ TELEM: NSR    LABS:                          13.3   11.91 )-----------( 308      ( 06 Nov 2021 06:43 )             42.2       06 Nov 2021 06:43    136    |  101    |  12     ----------------------------<  107<H>  4.2     |  24     |  1.00     05 Nov 2021 06:41    138    |  103    |  16     ----------------------------<  95     4.3     |  24     |  1.00     Ca    9.3        06 Nov 2021 06:43  Ca    9.2        05 Nov 2021 06:41  Phos  3.7       06 Nov 2021 06:43  Phos  3.7       05 Nov 2021 06:41  Mg     2.20      06 Nov 2021 06:43  Mg     2.20      05 Nov 2021 06:41    TPro  7.5    /  Alb  4.2    /  TBili  0.2    /  DBili  x      /  AST  43<H>  /  ALT  33     /  AlkPhos  71     06 Nov 2021 06:43  TPro  7.9    /  Alb  4.1    /  TBili  0.4    /  DBili  x      /  AST  56<H>  /  ALT  31     /  AlkPhos  75     05 Nov 2021 06:41    CARDIAC MARKERS ( 05 Nov 2021 06:41 )  x     / x     / 497 U/L / x     / 33.4 ng/mL        Creatine Kinase, Serum: 497 U/L (11-05-21 @ 06:41)  Creatine Kinase, Serum: 1552 U/L (11-04-21 @ 03:56)  Creatine Kinase, Serum: 1808 U/L (11-03-21 @ 22:25)  Creatine Kinase, Serum: 1465 U/L (11-03-21 @ 15:28)

## 2021-11-06 NOTE — PROGRESS NOTE ADULT - ATTENDING COMMENTS
31 year old man with 2-3 days of fever/chills. Developed chest pain last night at dinner. EKG showed posterior wall deep ST depressions V1 and V2. Cath lab activated and normal cors  ?Myocarditis. Tn uptrending 600s--1153 -->2500      TTE: 11/3  1. Endocardial visualization enhanced with intravenous  injection of echo contrast (Definity). Moderate to severe  segmental left ventricular systolic dysfunction. The basal  to mid inferoseptum, basal to mid lateral, basal inferior  and basal anterior walls are hypokinetic.  2. The right ventricle is not well visualized.      Meds:  ASA  Tylenol  Colchicine 0.6 mg BID  Motrin 800 mg TID    #Neuro- No active issues  #CV- Loy/pericarditis  TTE with moderate to severe LV Dysfunction  Cardiac MRI today  Continue treatment for pericarditis  Eventual initiation of GDMT with Toprol/Losartan  #ID- RVP negative  Likely viral etiology  #DVT ppx- sq heparin
31 year old man with 2-3 days of fever/chills. Developed chest pain last night at dinner. EKG showed posterior wall deep ST depressions V1 and V2. Cath lab activated and normal cors  ?Myocarditis. Tn uptrending 600s--1153 -->2500    TTE: 11/3  1. Endocardial visualization enhanced with intravenous  injection of echo contrast (Definity). Moderate to severe  segmental left ventricular systolic dysfunction. The basal  to mid inferoseptum, basal to mid lateral, basal inferior  and basal anterior walls are hypokinetic.  2. The right ventricle is not well visualized.    Meds:  ASA  Tylenol  Colchicine 0.6 mg BID  Motrin 800 mg TID  Metoprolol 12.5 mg BID    #Neuro- No active issues  #CV- Loy/pericarditis  TTE with moderate to severe LV Dysfunction  Cardiac MRI EF 41% with late rashid enhancement consistent with myocarditis  Continue treatment for pericarditis  Eventual initiation of GDMT   Change to Toprol  Start Losartan 12.5 mg Daily  #ID- RVP negative  Likely viral etiology  #DVT ppx- sq heparin
Perry Valenzuela is a 31 year old man,  (4th grade) with no PMH. He presented to the ED with chest pain having had several days of fever, chills and myalgias.  Initial ECG showed GHADA in I, II, III, aVF, V4-V6 and STD in avR, V1, V2, concerning for acute STEMI.  LHC showed no obstructive CAD. He was thus thought to have myopericarditis. Echocardiogram showed LVEF 48% and mild segmental LV systolic dysfunction. Cardiac MRI showing EF 41% with decreased LV and RV systolic dysfunction and segmental LGE. WBC 11.91 K/uL on 11/6/21. Serum creatinine 1.00 mg/dL on 11/6/21. Current regimen: colchicine 0.6 mg oral twice daily, enoxaparin 40 mg SC daily, ibuprofen 800 mg oral three times daily, influenza   vaccine 0.5 mL IM once, losartan 12.5  mg oral daily, metoprolol tartrate 12.5 mg oral every 12 hours and pantoprazole 40 mg oral before breakfast.
31 year old man with 2-3 days of fever/chills. Developed chest pain last night at dinner. EKG showed posterior wall deep ST depressions V1 and V2. Cath lab activated and normal cors  ?Myocarditis. Tn uptrending 600s--1153   Low grade temp    Meds:  ASA  Tylenol    -check TTE  -recheck RVP  -follow troponins

## 2021-11-06 NOTE — PROGRESS NOTE ADULT - ASSESSMENT
30 yo male with no PMH presenting to the ED with chest pain and a few days of fever, chills, myalgias admitted to the hospital for GHADA in I, II, III, aVF, V4-V6 and STD in avR, V1, V2. S/p RHC with no blockages seen. 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 Echo with EF 48% and mild segmental LV systolic dysfunction. Cardiac MRI showing EF 41% and decreased LV and RV systolic dysfunction.     Plan:    Neuro:  - A & O X 3  - No active issues    HEENT:  - No active issues    Respiratory:  - No active issues    CV:  #Fauzia-myocarditis:  - no further chest pain on colchicine and ibuprofen  - LHC with normal coronaries  - cardiac enzymes trending  - continue metoprolol 12.5 mg po BID  - continue losartan 12.5 mg po QD    -GI:  - DASH diet  - Protonix 40mg daily   - No active issues    Renal:  - No active issues    Endocrine:  - HbA1c 5.2, lipid panel wnl, TSH 2.04 wnl   - No other active issues    ID:  - afebrile, WBC trending down    DVT prophylaxis: Lovenox 40mg     DISPO:  - D/C planning today, followup with cardiology in 2 weeks.

## 2021-11-07 LAB
CV B1 AB TITR FLD: HIGH
CV B2 AB TITR FLD: HIGH
CV B3 AB TITR FLD: NEGATIVE — SIGNIFICANT CHANGE UP
CV B4 AB TITR FLD: HIGH
CV B5 AB TITR FLD: HIGH
CV B6 AB TITR FLD: HIGH

## 2021-11-08 LAB
CULTURE RESULTS: SIGNIFICANT CHANGE UP
CULTURE RESULTS: SIGNIFICANT CHANGE UP
SPECIMEN SOURCE: SIGNIFICANT CHANGE UP
SPECIMEN SOURCE: SIGNIFICANT CHANGE UP

## 2021-11-12 ENCOUNTER — NON-APPOINTMENT (OUTPATIENT)
Age: 31
End: 2021-11-12

## 2021-11-12 ENCOUNTER — APPOINTMENT (OUTPATIENT)
Dept: CARDIOLOGY | Facility: CLINIC | Age: 31
End: 2021-11-12
Payer: COMMERCIAL

## 2021-11-12 VITALS — DIASTOLIC BLOOD PRESSURE: 72 MMHG | OXYGEN SATURATION: 98 % | HEART RATE: 74 BPM | SYSTOLIC BLOOD PRESSURE: 118 MMHG

## 2021-11-12 VITALS — BODY MASS INDEX: 29.54 KG/M2 | HEIGHT: 66 IN

## 2021-11-12 VITALS — WEIGHT: 183 LBS

## 2021-11-12 DIAGNOSIS — Z78.9 OTHER SPECIFIED HEALTH STATUS: ICD-10-CM

## 2021-11-12 DIAGNOSIS — F17.200 NICOTINE DEPENDENCE, UNSPECIFIED, UNCOMPLICATED: ICD-10-CM

## 2021-11-12 PROCEDURE — 93000 ELECTROCARDIOGRAM COMPLETE: CPT

## 2021-11-12 PROCEDURE — 99214 OFFICE O/P EST MOD 30 MIN: CPT

## 2021-11-12 NOTE — HISTORY OF PRESENT ILLNESS
[FreeTextEntry1] : Cardiac MRI 11/4/21:\par IMPRESSION:\par 1.  The LV is normal in size with with decreased systolic function; LVEF: 41%.\par 2.  No LV thrombus.\par 3.  Foci of patchy subepicardial LGE suggestive of myocarditis.\par \par LHC:11/3/21:\par CORONARY VESSELS: The coronary circulation is right dominant.\par LM:   --  LM: Normal.\par LAD:   --  LAD: Normal.\par CX:   --  Circumflex: Normal.\par RCA:   --  RCA: Normal.\par COMPLICATIONS: There were no complications\par \par 31 year old man with no significant PMH who was admitted to [facility] CCU 11/2-11-6 with chest pain and myopericarditis. Complete workup performed including LHC and MRI consistent with myocarditis. He had viral like illness about 2-3 days prior to admission with fever and chills however RVP/COVID were negative. Completed COVID vaccination series in June. Here today for followup\par #Chronic systolic heart failure- EF on MRI 41% He is currently on Losartan 25 mg daily and Toprol 25 mg daily\par Condition is stable. Continue present meds\par Will repeat TTE in 3 months time to assess LV function\par #Pericarditis- continue Ibuprofen for total of 2 weeks (can stop on Monday) and continue Colchicine for 12 weeks.\par \par

## 2021-11-12 NOTE — DISCUSSION/SUMMARY
[FreeTextEntry1] : 31 year old man with myopericarditis likely from viral illness here for followup after discharge from CCU.\par \par #Chronic systolic heart failure- EF on MRI 41% He is currently on Losartan 25 mg daily and Toprol 25 mg daily\par Condition is stable. Continue present meds\par Will repeat TTE in 3 months time to assess LV function\par #Pericarditis- continue Ibuprofen for total of 2 weeks (can stop on Monday) and continue Colchicine for 12 weeks.\par # FU in 6 weeks

## 2021-12-20 ENCOUNTER — APPOINTMENT (OUTPATIENT)
Dept: CARDIOLOGY | Facility: CLINIC | Age: 31
End: 2021-12-20
Payer: COMMERCIAL

## 2021-12-20 ENCOUNTER — NON-APPOINTMENT (OUTPATIENT)
Age: 31
End: 2021-12-20

## 2021-12-20 VITALS
BODY MASS INDEX: 29.41 KG/M2 | SYSTOLIC BLOOD PRESSURE: 114 MMHG | OXYGEN SATURATION: 97 % | DIASTOLIC BLOOD PRESSURE: 77 MMHG | WEIGHT: 183 LBS | HEART RATE: 90 BPM | HEIGHT: 66 IN

## 2021-12-20 PROCEDURE — 93000 ELECTROCARDIOGRAM COMPLETE: CPT

## 2021-12-20 PROCEDURE — 99214 OFFICE O/P EST MOD 30 MIN: CPT

## 2021-12-20 RX ORDER — IBUPROFEN 800 MG/1
800 TABLET, FILM COATED ORAL 3 TIMES DAILY
Refills: 0 | Status: DISCONTINUED | COMMUNITY
End: 2021-12-20

## 2021-12-20 NOTE — HISTORY OF PRESENT ILLNESS
[FreeTextEntry1] : Here for followup. No new complaints\par 31 year old man with no significant PMH who was admitted to [facility] CCU 11/2-11-6 with chest pain and myopericarditis. Complete workup performed including LHC and MRI consistent with myocarditis. He had viral like illness about 2-3 days prior to admission with fever and chills however RVP/COVID were negative. Completed COVID vaccination series in June. Here today for followup\par #Chronic systolic heart failure- EF on MRI 41% He is currently on Losartan 25 mg daily and Toprol 25 mg daily\par Condition is stable. Continue present meds\par Will repeat TTE in 6 weeks to assess LV function\par #Pericarditis- continue Colchicine for 12 weeks.\par \par

## 2021-12-20 NOTE — DISCUSSION/SUMMARY
[FreeTextEntry1] : 31 year old man with myopericarditis likely from viral illness here for followup.\par #Chronic systolic heart failure- EF on MRI 41% He is currently on Losartan 25 mg daily and Toprol 25 mg daily\par Condition is stable. Continue present meds\par Will repeat TTE in 3 months time to assess LV function\par #Pericarditis- continue Colchicine for 12 weeks.\par # FU in 6 weeks with echo at that time

## 2022-02-11 ENCOUNTER — OUTPATIENT (OUTPATIENT)
Dept: OUTPATIENT SERVICES | Facility: HOSPITAL | Age: 32
LOS: 1 days | End: 2022-02-11

## 2022-02-11 ENCOUNTER — APPOINTMENT (OUTPATIENT)
Dept: CV DIAGNOSITCS | Facility: HOSPITAL | Age: 32
End: 2022-02-11
Payer: COMMERCIAL

## 2022-02-11 DIAGNOSIS — B33.22 VIRAL MYOCARDITIS: ICD-10-CM

## 2022-02-11 DIAGNOSIS — I50.22 CHRONIC SYSTOLIC (CONGESTIVE) HEART FAILURE: ICD-10-CM

## 2022-02-11 PROCEDURE — 93306 TTE W/DOPPLER COMPLETE: CPT | Mod: 26

## 2022-03-09 ENCOUNTER — RX RENEWAL (OUTPATIENT)
Age: 32
End: 2022-03-09

## 2022-03-11 ENCOUNTER — NON-APPOINTMENT (OUTPATIENT)
Age: 32
End: 2022-03-11

## 2022-03-11 ENCOUNTER — APPOINTMENT (OUTPATIENT)
Dept: CARDIOLOGY | Facility: CLINIC | Age: 32
End: 2022-03-11
Payer: COMMERCIAL

## 2022-03-11 VITALS
HEIGHT: 66 IN | WEIGHT: 180 LBS | DIASTOLIC BLOOD PRESSURE: 72 MMHG | OXYGEN SATURATION: 100 % | BODY MASS INDEX: 28.93 KG/M2 | SYSTOLIC BLOOD PRESSURE: 113 MMHG | HEART RATE: 73 BPM

## 2022-03-11 PROCEDURE — 93000 ELECTROCARDIOGRAM COMPLETE: CPT

## 2022-03-11 PROCEDURE — 99214 OFFICE O/P EST MOD 30 MIN: CPT

## 2022-03-11 RX ORDER — COLCHICINE 0.6 MG/1
0.6 TABLET ORAL
Qty: 60 | Refills: 3 | Status: DISCONTINUED | COMMUNITY
Start: 1900-01-01 | End: 2022-03-11

## 2022-03-11 NOTE — DISCUSSION/SUMMARY
[FreeTextEntry1] : 31 year old man with myopericarditis likely from viral illness here for followup.\par #Chronic systolic heart failure- EF now recovered. Remains on Losartan and Metoprolol, will continue for 1 year\par #Pericarditis- no further need for NSAIDs or colchicine\par #FU in 3-4 months\par

## 2022-03-11 NOTE — HISTORY OF PRESENT ILLNESS
[FreeTextEntry1] : Here for followup. No new complaints\par 31 year old man with no significant PMH who was admitted to [facility] CCU 11/2-11-6 with chest pain and myopericarditis. Complete workup performed including LHC and MRI consistent with myocarditis. He had viral like illness about 2-3 days prior to admission with fever and chills however RVP/COVID were negative. Completed COVID vaccination series in June. Here today for followup\par TTE 2/11/22 Normal LV function\par #Chronic systolic heart failure- EFnow recovered. Remains on Losartan and Metoprolol, will continue for 1 year\par #Pericarditis- no further need for NSAIDs or colchicine\par \par

## 2022-05-04 ENCOUNTER — RX RENEWAL (OUTPATIENT)
Age: 32
End: 2022-05-04

## 2022-05-04 NOTE — ED PROVIDER NOTE - CONSTITUTIONAL MANNER
Mr. Ananya Grimes was called informed of the negative stool culture result per Ronal Castillo. He stated she is doing much better pretty much back to normal.  I stated to him, we will call with the other lab results once they are received. He stated OK and thanks, message to Ronal Castillo for Henrique. appropriate for situation

## 2022-06-28 ENCOUNTER — NON-APPOINTMENT (OUTPATIENT)
Age: 32
End: 2022-06-28

## 2022-06-28 ENCOUNTER — APPOINTMENT (OUTPATIENT)
Dept: CARDIOLOGY | Facility: CLINIC | Age: 32
End: 2022-06-28
Payer: COMMERCIAL

## 2022-06-28 VITALS
HEART RATE: 66 BPM | OXYGEN SATURATION: 98 % | DIASTOLIC BLOOD PRESSURE: 78 MMHG | SYSTOLIC BLOOD PRESSURE: 118 MMHG | BODY MASS INDEX: 29.05 KG/M2 | WEIGHT: 180 LBS

## 2022-06-28 DIAGNOSIS — I50.22 CHRONIC SYSTOLIC (CONGESTIVE) HEART FAILURE: ICD-10-CM

## 2022-06-28 PROCEDURE — 99214 OFFICE O/P EST MOD 30 MIN: CPT

## 2022-06-28 PROCEDURE — 93000 ELECTROCARDIOGRAM COMPLETE: CPT

## 2022-06-28 NOTE — HISTORY OF PRESENT ILLNESS
[FreeTextEntry1] : Here for followup. No new complaints. \par 31 year old man with no significant PMH who was admitted to [facility] CCU 11/2-11-6 with chest pain and myopericarditis. Complete workup performed including LHC and MRI consistent with myocarditis. He had viral like illness about 2-3 days prior to admission with fever and chills however RVP/COVID were negative. Completed COVID vaccination series in June. Here today for followup\par TTE 2/11/22 Normal LV function\par #Chronic systolic heart failure- EF now recovered. Remains on Losartan and Metoprolol, will continue for 1 year\par #Pericarditis- no further need for NSAIDs or colchicine\par \par

## 2022-06-28 NOTE — DISCUSSION/SUMMARY
[FreeTextEntry1] : 32 year old man with myopericarditis likely from viral illness here for followup.\par #Chronic systolic heart failure- EF now recovered. Remains on Losartan and Metoprolol, will continue for 1 year\par #Pericarditis- Resolved. \par LIpid panel with PMD. \par #FU in 6 months\par  [EKG obtained to assist in diagnosis and management of assessed problem(s)] : EKG obtained to assist in diagnosis and management of assessed problem(s)

## 2022-07-14 ENCOUNTER — NON-APPOINTMENT (OUTPATIENT)
Age: 32
End: 2022-07-14

## 2022-09-22 ENCOUNTER — NON-APPOINTMENT (OUTPATIENT)
Age: 32
End: 2022-09-22

## 2022-09-22 ENCOUNTER — APPOINTMENT (OUTPATIENT)
Dept: CARDIOLOGY | Facility: CLINIC | Age: 32
End: 2022-09-22

## 2022-09-22 VITALS
DIASTOLIC BLOOD PRESSURE: 73 MMHG | HEART RATE: 74 BPM | WEIGHT: 181 LBS | OXYGEN SATURATION: 98 % | TEMPERATURE: 98.2 F | SYSTOLIC BLOOD PRESSURE: 116 MMHG | RESPIRATION RATE: 16 BRPM | BODY MASS INDEX: 29.21 KG/M2

## 2022-09-22 DIAGNOSIS — R07.9 CHEST PAIN, UNSPECIFIED: ICD-10-CM

## 2022-09-22 PROCEDURE — 99213 OFFICE O/P EST LOW 20 MIN: CPT | Mod: 25

## 2022-09-22 PROCEDURE — 93000 ELECTROCARDIOGRAM COMPLETE: CPT

## 2022-09-22 NOTE — HISTORY OF PRESENT ILLNESS
[FreeTextEntry1] : Here for followup.  \par 32 year old man with no significant PMH who was admitted to [facility] CCU 11/2/21-11/6/21 with chest pain and myopericarditis. Complete workup performed including LHC and MRI consistent with myocarditis. He had viral like illness about 2-3 days prior to admission with fever and chills however RVP/COVID were negative. Completed COVID vaccination series in June.\par TTE 2/11/22 Normal LV function\par \par He was to remain on both Metoprolol and Losartan for the year however he self dc'd them in June. In addition, he returned to using preworkout drinks and energy supplements. He had URI symptoms last weekend and went out with his friends despite this. On Sunday/Monday he did feel chest discomfort but did not go to ED> \par #Chronic systolic heart failure- EF now recovered.Will restart these meds\par Will also check TTE to assess LV function\par \par \par

## 2022-09-22 NOTE — DISCUSSION/SUMMARY
[FreeTextEntry1] : 32 year old man with myopericarditis likely from viral illness here for followup.\par He was to remain on both Metoprolol and Losartan for the year however he self dc'd them in June. In addition, he returned to using preworkout drinks and energy supplements. He had URI symptoms last weekend and went out with his friends despite this. On Sunday/Monday he did feel chest discomfort but did not go to ED> \par #Chronic systolic heart failure- EF now recovered.Will restart these meds\par Will also check TTE to assess LV function\par #FU in 3 months\par  [EKG obtained to assist in diagnosis and management of assessed problem(s)] : EKG obtained to assist in diagnosis and management of assessed problem(s)

## 2022-09-27 ENCOUNTER — OUTPATIENT (OUTPATIENT)
Dept: OUTPATIENT SERVICES | Facility: HOSPITAL | Age: 32
LOS: 1 days | End: 2022-09-27

## 2022-09-27 ENCOUNTER — APPOINTMENT (OUTPATIENT)
Dept: CV DIAGNOSITCS | Facility: HOSPITAL | Age: 32
End: 2022-09-27

## 2022-09-27 DIAGNOSIS — B33.22 VIRAL MYOCARDITIS: ICD-10-CM

## 2022-09-27 DIAGNOSIS — R07.9 CHEST PAIN, UNSPECIFIED: ICD-10-CM

## 2022-09-27 DIAGNOSIS — I50.22 CHRONIC SYSTOLIC (CONGESTIVE) HEART FAILURE: ICD-10-CM

## 2022-09-27 PROCEDURE — 93306 TTE W/DOPPLER COMPLETE: CPT | Mod: 26

## 2022-12-27 ENCOUNTER — APPOINTMENT (OUTPATIENT)
Dept: CARDIOLOGY | Facility: CLINIC | Age: 32
End: 2022-12-27

## 2022-12-27 NOTE — DISCUSSION/SUMMARY
[FreeTextEntry1] : 32 year old man with myopericarditis likely from viral illness here for followup.\par He was to remain on both Metoprolol and Losartan for the year however he self dc'd them in June. In addition, he returned to using preworkout drinks and energy supplements. He had URI symptoms last weekend and went out with his friends despite this. On Sunday/Monday he did feel chest discomfort but did not go to ED> \par #Chronic systolic heart failure- EF now recovered.Will restart these meds\par Will also check TTE to assess LV function\par #FU in 3 months\par

## 2022-12-27 NOTE — HISTORY OF PRESENT ILLNESS
[FreeTextEntry1] : Here for followup.  \par 32 year old man with no significant PMH who was admitted to [facility] CCU 11/2/21-11/6/21 with chest pain and myopericarditis. Complete workup performed including LHC and MRI consistent with myocarditis. He had viral like illness about 2-3 days prior to admission with fever and chills however RVP/COVID were negative. Completed COVID vaccination series in June.\par TTE 2/11/22 Normal LV function\par \par He was to remain on both Metoprolol and Losartan for the year however he self dc'd them in June. In addition, he returned to using preworkout drinks and energy supplements. He had URI symptoms last weekend and went out with his friends despite this. On Sunday/Monday he did feel chest discomfort but did not go to ED> \par #Chronic systolic heart failure- EF now recovered.Will restart these meds\par Will also check TTE to assess LV function\par \par \par

## 2023-06-05 ENCOUNTER — NON-APPOINTMENT (OUTPATIENT)
Age: 33
End: 2023-06-05

## 2023-06-05 ENCOUNTER — APPOINTMENT (OUTPATIENT)
Dept: CARDIOLOGY | Facility: CLINIC | Age: 33
End: 2023-06-05
Payer: COMMERCIAL

## 2023-06-05 VITALS
SYSTOLIC BLOOD PRESSURE: 114 MMHG | HEIGHT: 66 IN | DIASTOLIC BLOOD PRESSURE: 74 MMHG | TEMPERATURE: 98.2 F | OXYGEN SATURATION: 98 % | BODY MASS INDEX: 31.5 KG/M2 | HEART RATE: 76 BPM | WEIGHT: 196 LBS

## 2023-06-05 PROCEDURE — 93000 ELECTROCARDIOGRAM COMPLETE: CPT

## 2023-06-05 PROCEDURE — 99214 OFFICE O/P EST MOD 30 MIN: CPT | Mod: 25

## 2023-06-05 RX ORDER — METOPROLOL SUCCINATE 25 MG/1
25 TABLET, EXTENDED RELEASE ORAL
Qty: 90 | Refills: 3 | Status: DISCONTINUED | COMMUNITY
End: 2023-06-05

## 2023-06-05 RX ORDER — PANTOPRAZOLE 40 MG/1
40 TABLET, DELAYED RELEASE ORAL
Qty: 90 | Refills: 3 | Status: ACTIVE | COMMUNITY
Start: 2022-03-09 | End: 1900-01-01

## 2023-06-05 RX ORDER — LOSARTAN POTASSIUM 25 MG/1
25 TABLET, FILM COATED ORAL DAILY
Qty: 45 | Refills: 3 | Status: DISCONTINUED | COMMUNITY
End: 2023-06-05

## 2023-06-05 NOTE — DISCUSSION/SUMMARY
[FreeTextEntry1] : 32 year old man with myopericarditis likely from viral illness here for followup.\par \par #Chronic systolic heart failure- EF now recovered\par Can dc both Metoprolol and Losartan 0.5 tablets of each\par \par #FU in 1 year [EKG obtained to assist in diagnosis and management of assessed problem(s)] : EKG obtained to assist in diagnosis and management of assessed problem(s)

## 2023-06-05 NOTE — HISTORY OF PRESENT ILLNESS
[FreeTextEntry1] : Here for followup.  \par 32 year old man with no significant PMH who was admitted to [facility] CCU 11/2/21-11/6/21 with chest pain and myopericarditis. Complete workup performed including LHC and MRI consistent with myocarditis. He had viral like illness about 2-3 days prior to admission with fever and chills however RVP/COVID were negative. Completed COVID vaccination series in June.\par \par Off all energy and preworkout drinks\par \par TTE 2/11/22 Normal LV function\par TTE 9/27/22- Normal LV function\par \par \par #Chronic systolic heart failure- EF now recovered\par Can dc both Metoprolol and Losartan 0.5 tablets of each\par \par \par

## 2024-01-28 ENCOUNTER — NON-APPOINTMENT (OUTPATIENT)
Age: 34
End: 2024-01-28

## 2024-06-28 ENCOUNTER — NON-APPOINTMENT (OUTPATIENT)
Age: 34
End: 2024-06-28

## 2024-06-28 ENCOUNTER — APPOINTMENT (OUTPATIENT)
Dept: CARDIOLOGY | Facility: CLINIC | Age: 34
End: 2024-06-28
Payer: COMMERCIAL

## 2024-06-28 VITALS
BODY MASS INDEX: 31.66 KG/M2 | WEIGHT: 197 LBS | HEIGHT: 66 IN | SYSTOLIC BLOOD PRESSURE: 119 MMHG | HEART RATE: 72 BPM | DIASTOLIC BLOOD PRESSURE: 78 MMHG | OXYGEN SATURATION: 97 %

## 2024-06-28 DIAGNOSIS — B33.22 VIRAL MYOCARDITIS: ICD-10-CM

## 2024-06-28 PROCEDURE — 93000 ELECTROCARDIOGRAM COMPLETE: CPT

## 2024-06-28 PROCEDURE — 99214 OFFICE O/P EST MOD 30 MIN: CPT | Mod: 25

## 2024-11-14 NOTE — ED ADULT TRIAGE NOTE - PAIN RATING/NUMBER SCALE (0-10): ACTIVITY
Received call from pt with complaints of melena yesterday after ED visit for severe nosebleed. Pt denies dizziness, LFAs, fatigue. Flows WNL. Pt feels well. Epistaxis resolved. Pt with no history of GIB. No BM today     Melena likely 2/2 ingesting of bleed d/t nosebleed. Pt instructed to monitor for s/s of GIB including ongoing melena, dizziness, fatigue, LFAs. Notify VAD team and go to ED if they occur.     Will follow-up with pt this afternoon to confirm she still feels well.    Will request pt's coordinator contact her tomorrow for wellbeing check     Addendum 1630:  Contacted pt for update. Pt states her stool is still tarry but improving. Denies dizziness/LFAs. As melena is improving, it is likely 2/2 severe epistaxis yesterday. Pt to monitor for worsening melena and/or dizziness, LFAs, fatigue. Pt to contact VAD team if symptoms occur/worsen. Pt verbalized understanding and in agreement with POC.     Will have primary coordinator call for wellbeing check tomorrow.   
8